# Patient Record
Sex: MALE | Race: WHITE | Employment: OTHER | URBAN - METROPOLITAN AREA
[De-identification: names, ages, dates, MRNs, and addresses within clinical notes are randomized per-mention and may not be internally consistent; named-entity substitution may affect disease eponyms.]

---

## 2017-01-01 ENCOUNTER — APPOINTMENT (OUTPATIENT)
Dept: LAB | Age: 52
End: 2017-01-01
Attending: INTERNAL MEDICINE
Payer: MEDICARE

## 2017-01-01 ENCOUNTER — TELEPHONE (OUTPATIENT)
Dept: INTERNAL MEDICINE CLINIC | Facility: CLINIC | Age: 52
End: 2017-01-01

## 2017-01-01 ENCOUNTER — HOSPITAL ENCOUNTER (OUTPATIENT)
Dept: MRI IMAGING | Age: 52
Discharge: HOME | End: 2017-01-01
Attending: INTERNAL MEDICINE
Payer: MEDICARE

## 2017-01-01 ENCOUNTER — LAB ENCOUNTER (OUTPATIENT)
Dept: LAB | Age: 52
End: 2017-01-01
Attending: INTERNAL MEDICINE
Payer: MEDICARE

## 2017-01-01 ENCOUNTER — OFFICE VISIT (OUTPATIENT)
Dept: INTERNAL MEDICINE CLINIC | Facility: CLINIC | Age: 52
End: 2017-01-01

## 2017-01-01 VITALS
HEIGHT: 64 IN | WEIGHT: 178 LBS | DIASTOLIC BLOOD PRESSURE: 76 MMHG | BODY MASS INDEX: 30.39 KG/M2 | SYSTOLIC BLOOD PRESSURE: 116 MMHG | HEART RATE: 76 BPM | TEMPERATURE: 98 F | OXYGEN SATURATION: 98 % | RESPIRATION RATE: 16 BRPM

## 2017-01-01 DIAGNOSIS — Z00.00 ENCOUNTER FOR ANNUAL HEALTH EXAMINATION: ICD-10-CM

## 2017-01-01 DIAGNOSIS — R27.0 ATAXIA: ICD-10-CM

## 2017-01-01 DIAGNOSIS — E83.52 HYPERCALCEMIA: ICD-10-CM

## 2017-01-01 DIAGNOSIS — R42 DIZZINESS: ICD-10-CM

## 2017-01-01 DIAGNOSIS — Z79.899 LONG-TERM USE OF HIGH-RISK MEDICATION: ICD-10-CM

## 2017-01-01 DIAGNOSIS — E83.52 SERUM CALCIUM ELEVATED: Primary | ICD-10-CM

## 2017-01-01 DIAGNOSIS — N18.2 CKD (CHRONIC KIDNEY DISEASE) STAGE 2, GFR 60-89 ML/MIN: ICD-10-CM

## 2017-01-01 DIAGNOSIS — Z00.00 PHYSICAL EXAM, ANNUAL: Primary | ICD-10-CM

## 2017-01-01 DIAGNOSIS — E83.52 SERUM CALCIUM ELEVATED: ICD-10-CM

## 2017-01-01 DIAGNOSIS — R79.89 ELEVATED PTHRP LEVEL: ICD-10-CM

## 2017-01-01 DIAGNOSIS — Z13.6 SCREENING FOR CARDIOVASCULAR CONDITION: ICD-10-CM

## 2017-01-01 DIAGNOSIS — F30.9 BIPOLAR I DISORDER, SINGLE MANIC EPISODE (HCC): ICD-10-CM

## 2017-01-01 DIAGNOSIS — Z12.5 PROSTATE CANCER SCREENING: ICD-10-CM

## 2017-01-01 DIAGNOSIS — E34.9 INCREASED PTH LEVEL: Primary | ICD-10-CM

## 2017-01-01 DIAGNOSIS — Z13.31 DEPRESSION SCREENING: ICD-10-CM

## 2017-01-01 DIAGNOSIS — Z23 NEED FOR INFLUENZA VACCINATION: ICD-10-CM

## 2017-01-01 PROCEDURE — G0446 INTENS BEHAVE THER CARDIO DX: HCPCS | Performed by: INTERNAL MEDICINE

## 2017-01-01 PROCEDURE — 80053 COMPREHEN METABOLIC PANEL: CPT | Performed by: INTERNAL MEDICINE

## 2017-01-01 PROCEDURE — 80061 LIPID PANEL: CPT | Performed by: INTERNAL MEDICINE

## 2017-01-01 PROCEDURE — 70551 MRI BRAIN STEM W/O DYE: CPT | Performed by: INTERNAL MEDICINE

## 2017-01-01 PROCEDURE — 96160 PT-FOCUSED HLTH RISK ASSMT: CPT | Performed by: INTERNAL MEDICINE

## 2017-01-01 PROCEDURE — G0103 PSA SCREENING: HCPCS | Performed by: INTERNAL MEDICINE

## 2017-01-01 PROCEDURE — G0439 PPPS, SUBSEQ VISIT: HCPCS | Performed by: INTERNAL MEDICINE

## 2017-01-01 PROCEDURE — 82340 ASSAY OF CALCIUM IN URINE: CPT | Performed by: INTERNAL MEDICINE

## 2017-01-01 PROCEDURE — 81003 URINALYSIS AUTO W/O SCOPE: CPT | Performed by: INTERNAL MEDICINE

## 2017-01-01 PROCEDURE — 90686 IIV4 VACC NO PRSV 0.5 ML IM: CPT | Performed by: INTERNAL MEDICINE

## 2017-01-01 PROCEDURE — G0008 ADMIN INFLUENZA VIRUS VAC: HCPCS | Performed by: INTERNAL MEDICINE

## 2017-01-01 PROCEDURE — 36415 COLL VENOUS BLD VENIPUNCTURE: CPT | Performed by: INTERNAL MEDICINE

## 2017-01-01 PROCEDURE — 80178 ASSAY OF LITHIUM: CPT | Performed by: INTERNAL MEDICINE

## 2017-01-01 PROCEDURE — 85025 COMPLETE CBC W/AUTO DIFF WBC: CPT | Performed by: INTERNAL MEDICINE

## 2017-01-01 PROCEDURE — 83970 ASSAY OF PARATHORMONE: CPT | Performed by: INTERNAL MEDICINE

## 2017-01-01 PROCEDURE — 82330 ASSAY OF CALCIUM: CPT | Performed by: INTERNAL MEDICINE

## 2017-01-01 PROCEDURE — 84443 ASSAY THYROID STIM HORMONE: CPT | Performed by: INTERNAL MEDICINE

## 2017-01-01 RX ORDER — OLANZAPINE 20 MG/1
20 TABLET ORAL NIGHTLY
Refills: 0 | Status: ON HOLD | COMMUNITY
End: 2018-01-01

## 2017-03-09 ENCOUNTER — TELEPHONE (OUTPATIENT)
Dept: INTERNAL MEDICINE CLINIC | Facility: CLINIC | Age: 52
End: 2017-03-09

## 2017-03-09 NOTE — TELEPHONE ENCOUNTER
[address] [facility] [phone]  [phone]    Fax  [phone]  Mail  [facility] [id number] [facility]  [address] [id number]

## 2017-06-26 NOTE — TELEPHONE ENCOUNTER
CARIDAD - per [employer], home health performed a home health evaluation for the patient - he was instructed to contact our office for follow up. If questions call [phone].

## 2017-10-30 PROBLEM — R27.0 ATAXIA: Status: ACTIVE | Noted: 2017-01-01

## 2017-10-30 PROBLEM — R42 DIZZINESS: Status: ACTIVE | Noted: 2017-01-01

## 2017-10-30 NOTE — PROGRESS NOTES
HPI:   Kaz Thompson is a 47year old male who presents for a MA (Medicare Advantage) Supervisit (Once per calendar year). HPI:  Here with sister in law  She reports that pt as in [address] [place] [address],[address]. Had syncopal episode. Thought to be due to dehydration.  Labs SOCIAL HISTORY:   He  reports that he has never smoked. He has never used smokeless tobacco. He reports that he does not drink alcohol or use drugs.      REVIEW OF SYSTEMS:   GENERAL: feels well otherwise  SKIN: denies any unusual skin lesions  EYES: john tenderness/mass/nodules, no carotid bruit or JVD   Back:   Symmetric, no curvature, ROM normal, no CVA tenderness   Lungs:   Clear to auscultation bilaterally, respirations unlabored   Chest Wall:  No tenderness or deformity   Heart:  Regular rate and rhyt Future    Ataxia  -     TSH W REFLEX TO FREE T4; Future  -     URINALYSIS, ROUTINE; Future  -     MRI BRAIN/IAC (ALL W+WO CNTRST) (CPT=70553);  Future    Prostate cancer screening  -     PSA SCREEN; Future    Dizziness  -     MRI BRAIN/IAC (ALL W+WO CNTRST) maintain positive mental well-being?: Visiting Friends;Games    If you are a male age 38-65 or a female age 47-67, do you take aspirin?: No    Have you had any immunizations at another office such as Influenza, Hepatitis B, Tetanus, or Pneumococcal?: No the week is this?: Correct    What month is it?: Correct    What year is it?: Correct    Recall \"Ball\": Correct    Recall \"Flag\": Correct    Recall \"Tree\": Correct       This section provided for quick review of chart, separate sheet to patient  PREV 13 (Prevnar) No orders found for this or any previous visit. Pneumococcal 23 (Pneumovax) No orders found for this or any previous visit. Hepatitis B No orders found for this or any previous visit.      Tetanus   Orders placed or performed in visit

## 2017-10-30 NOTE — PATIENT INSTRUCTIONS
Diet for Chronic Kidney Disease  Following a special diet when you have kidney disease can help you stay as healthy as possible. Your healthcare provider or dietitian should make a special diet plan just for you.     Eating right  Here are some good eatin · Lauralyn Lars is important to build strong bones. But foods high in calcium are also high in phosphorus, which can take calcium from your bones. Limiting foods high in phosphorus will help keep calcium in your bones.  Ask your provider how much calcium ---------- Medicare covers annually or at 6-month intervals for prediabetic patients        Cardiovascular Disease Screening     Cholesterol, covered every 5 yrs including Total, LDL and Trigs LDL-CHOLESTEROL (mg/dL (calc))   Date Value   04/01/2011 95 PSA  Annually to 75 then as needed or ANY annually to 75 PSA due on 11/01/2018  No results found for this or any previous visit.    Annually (age 48 or over), ANY not paid separately when covered E/M service is provided on same date    Immunizations      I http://www. idph.state. [place]/public/books/advin.htm  A link to the [employer] Medium. This site has a lot of good information including definitions of the different types of Advance Directives.  It also has the State forms available on it's web

## 2017-11-21 NOTE — TELEPHONE ENCOUNTER
MRI dept are having issues getting a vein on pt, kisha was told that pt does need contrast she will try again and if not will send him over to the hospital to have it done

## 2017-11-21 NOTE — TELEPHONE ENCOUNTER
Patient walked up to the , and is asking does he really need to do MRI? Please advise. He is seated out front.

## 2017-11-21 NOTE — TELEPHONE ENCOUNTER
MRI ordered for dizziness per Dr. Dsouza Patient contrast needs to be given and images obtained. Siri kSy was made aware of this.

## 2017-11-21 NOTE — TELEPHONE ENCOUNTER
Per Dr. Scott Alvarado pt needs to complete MRI with contrast.    D/w pt above he will call and r/s at hospital as off site could not access a vein to give IV contrast. Pt expressed understanding.

## 2017-11-27 NOTE — TELEPHONE ENCOUNTER
Spoke with patient and relayed test results. He verbalized understanding and agreed with plan. He requested that a copy of his labs be faxed to his psychiatrist Dr. Carina Quinn @ fax # [phone]      Order for 24hr urine placed and labs faxed to psych.

## 2017-11-27 NOTE — TELEPHONE ENCOUNTER
Notes Recorded by Gallo Gaming MD on 11/22/2017 at 8:58 AM CST  PTH is elevated. Suspect due to CKD. Await ionized Ca.  Does pt see renal. If not have him see Dr. Spenser Bill    Notes Recorded by Gallo Gaming MD on 11/21/2017 at 2:17 PM CST  Normal lithium lev

## 2017-11-27 NOTE — TELEPHONE ENCOUNTER
----- Message from Marybelle Mortimer, MD sent at 11/24/2017 10:23 AM CST -----  pth and calcium is elevated,  Check 24 hour urine collection for calcium for workup

## 2018-01-01 ENCOUNTER — ANESTHESIA EVENT (OUTPATIENT)
Dept: SURGERY | Facility: HOSPITAL | Age: 53
End: 2018-01-01

## 2018-01-01 ENCOUNTER — HOSPITAL ENCOUNTER (OUTPATIENT)
Dept: NUCLEAR MEDICINE | Facility: HOSPITAL | Age: 53
Discharge: HOME | End: 2018-01-01
Attending: OTOLARYNGOLOGY
Payer: MEDICARE

## 2018-01-01 ENCOUNTER — APPOINTMENT (OUTPATIENT)
Dept: GENERAL RADIOLOGY | Facility: HOSPITAL | Age: 53
DRG: 004 | End: 2018-01-01
Attending: INTERNAL MEDICINE
Payer: MEDICARE

## 2018-01-01 ENCOUNTER — APPOINTMENT (OUTPATIENT)
Dept: MRI IMAGING | Facility: HOSPITAL | Age: 53
DRG: 004 | End: 2018-01-01
Attending: PHYSICIAN ASSISTANT
Payer: MEDICARE

## 2018-01-01 ENCOUNTER — APPOINTMENT (OUTPATIENT)
Dept: GENERAL RADIOLOGY | Facility: HOSPITAL | Age: 53
DRG: 004 | End: 2018-01-01
Attending: NURSE PRACTITIONER
Payer: MEDICARE

## 2018-01-01 ENCOUNTER — SURGERY (OUTPATIENT)
Age: 53
End: 2018-01-01

## 2018-01-01 ENCOUNTER — APPOINTMENT (OUTPATIENT)
Dept: ULTRASOUND IMAGING | Facility: HOSPITAL | Age: 53
DRG: 004 | End: 2018-01-01
Attending: INTERNAL MEDICINE
Payer: MEDICARE

## 2018-01-01 ENCOUNTER — HOSPITAL ENCOUNTER (INPATIENT)
Facility: HOSPITAL | Age: 53
LOS: 4 days | DRG: 091 | End: 2018-01-01
Attending: HOSPITALIST | Admitting: HOSPITALIST
Payer: OTHER MISCELLANEOUS

## 2018-01-01 ENCOUNTER — APPOINTMENT (OUTPATIENT)
Dept: MRI IMAGING | Facility: HOSPITAL | Age: 53
DRG: 004 | End: 2018-01-01
Attending: Other
Payer: MEDICARE

## 2018-01-01 ENCOUNTER — APPOINTMENT (OUTPATIENT)
Dept: GENERAL RADIOLOGY | Facility: HOSPITAL | Age: 53
DRG: 004 | End: 2018-01-01
Attending: PHYSICIAN ASSISTANT
Payer: MEDICARE

## 2018-01-01 ENCOUNTER — HOSPITAL ENCOUNTER (OUTPATIENT)
Dept: ULTRASOUND IMAGING | Age: 53
Discharge: HOME | End: 2018-01-01
Attending: OTOLARYNGOLOGY
Payer: MEDICARE

## 2018-01-01 ENCOUNTER — HOSPITAL ENCOUNTER (INPATIENT)
Facility: HOSPITAL | Age: 53
LOS: 14 days | Discharge: HOSPICE | DRG: 004 | End: 2018-01-01
Attending: EMERGENCY MEDICINE | Admitting: HOSPITALIST
Payer: MEDICARE

## 2018-01-01 ENCOUNTER — APPOINTMENT (OUTPATIENT)
Dept: GENERAL RADIOLOGY | Facility: HOSPITAL | Age: 53
DRG: 004 | End: 2018-01-01
Attending: EMERGENCY MEDICINE
Payer: MEDICARE

## 2018-01-01 ENCOUNTER — TELEPHONE (OUTPATIENT)
Dept: INTERNAL MEDICINE CLINIC | Facility: CLINIC | Age: 53
End: 2018-01-01

## 2018-01-01 ENCOUNTER — APPOINTMENT (OUTPATIENT)
Dept: CT IMAGING | Facility: HOSPITAL | Age: 53
DRG: 004 | End: 2018-01-01
Attending: EMERGENCY MEDICINE
Payer: MEDICARE

## 2018-01-01 ENCOUNTER — HOSPITAL ENCOUNTER (OUTPATIENT)
Facility: HOSPITAL | Age: 53
Discharge: HOME | End: 2018-01-01
Attending: OTOLARYNGOLOGY | Admitting: OTOLARYNGOLOGY
Payer: MEDICARE

## 2018-01-01 ENCOUNTER — APPOINTMENT (OUTPATIENT)
Dept: CT IMAGING | Facility: HOSPITAL | Age: 53
DRG: 004 | End: 2018-01-01
Attending: INTERNAL MEDICINE
Payer: MEDICARE

## 2018-01-01 ENCOUNTER — ANESTHESIA (OUTPATIENT)
Dept: SURGERY | Facility: HOSPITAL | Age: 53
End: 2018-01-01

## 2018-01-01 ENCOUNTER — APPOINTMENT (OUTPATIENT)
Dept: GENERAL RADIOLOGY | Facility: HOSPITAL | Age: 53
DRG: 004 | End: 2018-01-01
Attending: OTOLARYNGOLOGY
Payer: MEDICARE

## 2018-01-01 VITALS
WEIGHT: 149.06 LBS | HEART RATE: 110 BPM | DIASTOLIC BLOOD PRESSURE: 62 MMHG | SYSTOLIC BLOOD PRESSURE: 144 MMHG | RESPIRATION RATE: 42 BRPM | TEMPERATURE: 99 F | OXYGEN SATURATION: 97 % | HEIGHT: 64 IN | BODY MASS INDEX: 25.45 KG/M2

## 2018-01-01 VITALS
TEMPERATURE: 99 F | OXYGEN SATURATION: 83 % | HEART RATE: 64 BPM | DIASTOLIC BLOOD PRESSURE: 53 MMHG | SYSTOLIC BLOOD PRESSURE: 100 MMHG | RESPIRATION RATE: 12 BRPM

## 2018-01-01 VITALS
HEIGHT: 64 IN | SYSTOLIC BLOOD PRESSURE: 125 MMHG | OXYGEN SATURATION: 97 % | DIASTOLIC BLOOD PRESSURE: 63 MMHG | WEIGHT: 156 LBS | HEART RATE: 66 BPM | BODY MASS INDEX: 26.63 KG/M2 | TEMPERATURE: 98 F | RESPIRATION RATE: 16 BRPM

## 2018-01-01 DIAGNOSIS — E21.3 HYPERPARATHYROIDISM (HCC): ICD-10-CM

## 2018-01-01 DIAGNOSIS — D64.9 ANEMIA, UNSPECIFIED TYPE: ICD-10-CM

## 2018-01-01 DIAGNOSIS — H10.33 ACUTE CONJUNCTIVITIS OF BOTH EYES, UNSPECIFIED ACUTE CONJUNCTIVITIS TYPE: ICD-10-CM

## 2018-01-01 DIAGNOSIS — R53.83 LETHARGY: Primary | ICD-10-CM

## 2018-01-01 DIAGNOSIS — R79.89 ELEVATED LITHIUM LEVEL: ICD-10-CM

## 2018-01-01 DIAGNOSIS — F31.9 BIPOLAR AFFECTIVE DISORDER, REMISSION STATUS UNSPECIFIED (HCC): ICD-10-CM

## 2018-01-01 PROCEDURE — 99232 SBSQ HOSP IP/OBS MODERATE 35: CPT | Performed by: INTERNAL MEDICINE

## 2018-01-01 PROCEDURE — 71045 X-RAY EXAM CHEST 1 VIEW: CPT | Performed by: INTERNAL MEDICINE

## 2018-01-01 PROCEDURE — 99221 1ST HOSP IP/OBS SF/LOW 40: CPT | Performed by: CLINICAL NURSE SPECIALIST

## 2018-01-01 PROCEDURE — 5A1955Z RESPIRATORY VENTILATION, GREATER THAN 96 CONSECUTIVE HOURS: ICD-10-PCS | Performed by: ANESTHESIOLOGY

## 2018-01-01 PROCEDURE — 71045 X-RAY EXAM CHEST 1 VIEW: CPT | Performed by: NURSE PRACTITIONER

## 2018-01-01 PROCEDURE — 71045 X-RAY EXAM CHEST 1 VIEW: CPT | Performed by: OTOLARYNGOLOGY

## 2018-01-01 PROCEDURE — 78072 PARATHYRD PLANAR W/SPECT&CT: CPT | Performed by: OTOLARYNGOLOGY

## 2018-01-01 PROCEDURE — 99232 SBSQ HOSP IP/OBS MODERATE 35: CPT | Performed by: OTHER

## 2018-01-01 PROCEDURE — 99233 SBSQ HOSP IP/OBS HIGH 50: CPT | Performed by: INTERNAL MEDICINE

## 2018-01-01 PROCEDURE — 76770 US EXAM ABDO BACK WALL COMP: CPT | Performed by: INTERNAL MEDICINE

## 2018-01-01 PROCEDURE — 99233 SBSQ HOSP IP/OBS HIGH 50: CPT | Performed by: OTHER

## 2018-01-01 PROCEDURE — 70450 CT HEAD/BRAIN W/O DYE: CPT | Performed by: INTERNAL MEDICINE

## 2018-01-01 PROCEDURE — 02HV33Z INSERTION OF INFUSION DEVICE INTO SUPERIOR VENA CAVA, PERCUTANEOUS APPROACH: ICD-10-PCS | Performed by: HOSPITALIST

## 2018-01-01 PROCEDURE — 99231 SBSQ HOSP IP/OBS SF/LOW 25: CPT | Performed by: HOSPITALIST

## 2018-01-01 PROCEDURE — 93970 EXTREMITY STUDY: CPT | Performed by: INTERNAL MEDICINE

## 2018-01-01 PROCEDURE — 99291 CRITICAL CARE FIRST HOUR: CPT | Performed by: OTHER

## 2018-01-01 PROCEDURE — 74018 RADEX ABDOMEN 1 VIEW: CPT | Performed by: NURSE PRACTITIONER

## 2018-01-01 PROCEDURE — 99233 SBSQ HOSP IP/OBS HIGH 50: CPT | Performed by: HOSPITALIST

## 2018-01-01 PROCEDURE — 95819 EEG AWAKE AND ASLEEP: CPT | Performed by: OTHER

## 2018-01-01 PROCEDURE — 95816 EEG AWAKE AND DROWSY: CPT | Performed by: OTHER

## 2018-01-01 PROCEDURE — 71045 X-RAY EXAM CHEST 1 VIEW: CPT | Performed by: EMERGENCY MEDICINE

## 2018-01-01 PROCEDURE — 76700 US EXAM ABDOM COMPLETE: CPT | Performed by: INTERNAL MEDICINE

## 2018-01-01 PROCEDURE — 90792 PSYCH DIAG EVAL W/MED SRVCS: CPT | Performed by: OTHER

## 2018-01-01 PROCEDURE — 0B110F4 BYPASS TRACHEA TO CUTANEOUS WITH TRACHEOSTOMY DEVICE, OPEN APPROACH: ICD-10-PCS | Performed by: OTOLARYNGOLOGY

## 2018-01-01 PROCEDURE — 99291 CRITICAL CARE FIRST HOUR: CPT | Performed by: INTERNAL MEDICINE

## 2018-01-01 PROCEDURE — 99232 SBSQ HOSP IP/OBS MODERATE 35: CPT | Performed by: HOSPITALIST

## 2018-01-01 PROCEDURE — 76536 US EXAM OF HEAD AND NECK: CPT | Performed by: OTOLARYNGOLOGY

## 2018-01-01 PROCEDURE — 99219 INITIAL OBSERVATION CARE,LEVL II: CPT | Performed by: INTERNAL MEDICINE

## 2018-01-01 PROCEDURE — 99223 1ST HOSP IP/OBS HIGH 75: CPT | Performed by: OTHER

## 2018-01-01 PROCEDURE — 0GTQ0ZZ RESECTION OF MULTIPLE PARATHYROID GLANDS, OPEN APPROACH: ICD-10-PCS | Performed by: OTOLARYNGOLOGY

## 2018-01-01 PROCEDURE — 0BH18EZ INSERTION OF ENDOTRACHEAL AIRWAY INTO TRACHEA, VIA NATURAL OR ARTIFICIAL OPENING ENDOSCOPIC: ICD-10-PCS | Performed by: ANESTHESIOLOGY

## 2018-01-01 PROCEDURE — 99222 1ST HOSP IP/OBS MODERATE 55: CPT | Performed by: INTERNAL MEDICINE

## 2018-01-01 PROCEDURE — B548ZZA ULTRASONOGRAPHY OF SUPERIOR VENA CAVA, GUIDANCE: ICD-10-PCS | Performed by: HOSPITALIST

## 2018-01-01 PROCEDURE — 70450 CT HEAD/BRAIN W/O DYE: CPT | Performed by: EMERGENCY MEDICINE

## 2018-01-01 PROCEDURE — 70551 MRI BRAIN STEM W/O DYE: CPT | Performed by: OTHER

## 2018-01-01 PROCEDURE — 99223 1ST HOSP IP/OBS HIGH 75: CPT | Performed by: HOSPITALIST

## 2018-01-01 PROCEDURE — 95822 EEG COMA OR SLEEP ONLY: CPT | Performed by: OTHER

## 2018-01-01 PROCEDURE — 70551 MRI BRAIN STEM W/O DYE: CPT | Performed by: PHYSICIAN ASSISTANT

## 2018-01-01 RX ORDER — PEPPERMINT OIL
10 OIL (ML) MISCELLANEOUS AS NEEDED
Status: CANCELLED | OUTPATIENT
Start: 2018-01-01

## 2018-01-01 RX ORDER — INDOMETHACIN 25 MG/1
50 CAPSULE ORAL 2 TIMES DAILY
Status: DISCONTINUED | OUTPATIENT
Start: 2018-01-01 | End: 2018-01-01

## 2018-01-01 RX ORDER — DEXTROSE MONOHYDRATE 25 G/50ML
50 INJECTION, SOLUTION INTRAVENOUS
Status: DISCONTINUED | OUTPATIENT
Start: 2018-01-01 | End: 2018-01-01

## 2018-01-01 RX ORDER — HYDROCODONE BITARTRATE AND ACETAMINOPHEN 5; 325 MG/1; MG/1
2 TABLET ORAL EVERY 4 HOURS PRN
Qty: 30 TABLET | Refills: 0 | Status: SHIPPED | OUTPATIENT
Start: 2018-01-01 | End: 2018-01-01

## 2018-01-01 RX ORDER — MIDAZOLAM HYDROCHLORIDE 1 MG/ML
2 INJECTION INTRAMUSCULAR; INTRAVENOUS EVERY 5 MIN PRN
Status: DISCONTINUED | OUTPATIENT
Start: 2018-01-01 | End: 2018-01-01

## 2018-01-01 RX ORDER — TOBRAMYCIN 3 MG/ML
1 SOLUTION/ DROPS OPHTHALMIC
Status: DISCONTINUED | OUTPATIENT
Start: 2018-01-01 | End: 2018-01-01

## 2018-01-01 RX ORDER — BUPROPION HYDROCHLORIDE 75 MG/1
75 TABLET ORAL DAILY
Status: DISCONTINUED | OUTPATIENT
Start: 2018-01-01 | End: 2018-01-01

## 2018-01-01 RX ORDER — GLYCOPYRROLATE 0.2 MG/ML
0.4 INJECTION, SOLUTION INTRAMUSCULAR; INTRAVENOUS
Status: DISCONTINUED | OUTPATIENT
Start: 2018-01-01 | End: 2018-01-01

## 2018-01-01 RX ORDER — ACETAMINOPHEN 650 MG/1
650 SUPPOSITORY RECTAL EVERY 6 HOURS PRN
Status: CANCELLED | OUTPATIENT
Start: 2018-01-01

## 2018-01-01 RX ORDER — MIDAZOLAM HYDROCHLORIDE 1 MG/ML
1 INJECTION INTRAMUSCULAR; INTRAVENOUS EVERY 5 MIN PRN
Status: DISCONTINUED | OUTPATIENT
Start: 2018-01-01 | End: 2018-01-01 | Stop reason: HOSPADM

## 2018-01-01 RX ORDER — SODIUM PHOSPHATE, DIBASIC AND SODIUM PHOSPHATE, MONOBASIC 7; 19 G/133ML; G/133ML
1 ENEMA RECTAL ONCE AS NEEDED
Status: DISCONTINUED | OUTPATIENT
Start: 2018-01-01 | End: 2018-01-01

## 2018-01-01 RX ORDER — SCOLOPAMINE TRANSDERMAL SYSTEM 1 MG/1
1 PATCH, EXTENDED RELEASE TRANSDERMAL
Status: DISCONTINUED | OUTPATIENT
Start: 2018-01-01 | End: 2018-01-01

## 2018-01-01 RX ORDER — BISACODYL 10 MG
10 SUPPOSITORY, RECTAL RECTAL
Status: CANCELLED | OUTPATIENT
Start: 2018-01-01

## 2018-01-01 RX ORDER — LORAZEPAM 2 MG/ML
2 INJECTION INTRAMUSCULAR EVERY 4 HOURS PRN
Status: DISCONTINUED | OUTPATIENT
Start: 2018-01-01 | End: 2018-01-01

## 2018-01-01 RX ORDER — LORAZEPAM 2 MG/ML
0.5 INJECTION INTRAMUSCULAR EVERY 4 HOURS PRN
Status: DISCONTINUED | OUTPATIENT
Start: 2018-01-01 | End: 2018-01-01

## 2018-01-01 RX ORDER — SODIUM CHLORIDE, SODIUM LACTATE, POTASSIUM CHLORIDE, CALCIUM CHLORIDE 600; 310; 30; 20 MG/100ML; MG/100ML; MG/100ML; MG/100ML
INJECTION, SOLUTION INTRAVENOUS CONTINUOUS
Status: DISCONTINUED | OUTPATIENT
Start: 2018-01-01 | End: 2018-01-01 | Stop reason: HOSPADM

## 2018-01-01 RX ORDER — CALCIUM CARBONATE 200(500)MG
1500 TABLET,CHEWABLE ORAL
Status: DISCONTINUED | OUTPATIENT
Start: 2018-01-01 | End: 2018-01-01

## 2018-01-01 RX ORDER — LABETALOL HYDROCHLORIDE 5 MG/ML
5 INJECTION, SOLUTION INTRAVENOUS EVERY 5 MIN PRN
Status: DISCONTINUED | OUTPATIENT
Start: 2018-01-01 | End: 2018-01-01 | Stop reason: HOSPADM

## 2018-01-01 RX ORDER — BISACODYL 10 MG
10 SUPPOSITORY, RECTAL RECTAL
Status: DISCONTINUED | OUTPATIENT
Start: 2018-01-01 | End: 2018-01-01

## 2018-01-01 RX ORDER — ARIPIPRAZOLE 15 MG/1
40 TABLET ORAL ONCE
Status: COMPLETED | OUTPATIENT
Start: 2018-01-01 | End: 2018-01-01

## 2018-01-01 RX ORDER — LIDOCAINE HYDROCHLORIDE AND EPINEPHRINE 10; 10 MG/ML; UG/ML
INJECTION, SOLUTION INFILTRATION; PERINEURAL AS NEEDED
Status: DISCONTINUED | OUTPATIENT
Start: 2018-01-01 | End: 2018-01-01 | Stop reason: HOSPADM

## 2018-01-01 RX ORDER — LITHIUM CARBONATE 600 MG/1
600 CAPSULE ORAL NIGHTLY
Status: DISCONTINUED | OUTPATIENT
Start: 2018-01-01 | End: 2018-01-01

## 2018-01-01 RX ORDER — CALCITRIOL 0.25 UG/1
0.25 CAPSULE, LIQUID FILLED ORAL DAILY
Status: DISCONTINUED | OUTPATIENT
Start: 2018-01-01 | End: 2018-01-01

## 2018-01-01 RX ORDER — LITHIUM CARBONATE 300 MG/1
600 CAPSULE ORAL NIGHTLY
Status: DISCONTINUED | OUTPATIENT
Start: 2018-01-01 | End: 2018-01-01

## 2018-01-01 RX ORDER — DEXTROSE MONOHYDRATE 50 MG/ML
INJECTION, SOLUTION INTRAVENOUS CONTINUOUS
Status: DISCONTINUED | OUTPATIENT
Start: 2018-01-01 | End: 2018-01-01

## 2018-01-01 RX ORDER — SODIUM CHLORIDE 0.9 % (FLUSH) 0.9 %
10 SYRINGE (ML) INJECTION AS NEEDED
Status: DISCONTINUED | OUTPATIENT
Start: 2018-01-01 | End: 2018-01-01

## 2018-01-01 RX ORDER — SODIUM PHOSPHATE, DIBASIC AND SODIUM PHOSPHATE, MONOBASIC 7; 19 G/133ML; G/133ML
1 ENEMA RECTAL ONCE AS NEEDED
Status: CANCELLED | OUTPATIENT
Start: 2018-01-01

## 2018-01-01 RX ORDER — ACETAMINOPHEN 650 MG/1
650 SUPPOSITORY RECTAL ONCE
Status: COMPLETED | OUTPATIENT
Start: 2018-01-01 | End: 2018-01-01

## 2018-01-01 RX ORDER — PEPPERMINT OIL
10 OIL (ML) MISCELLANEOUS AS NEEDED
Status: DISCONTINUED | OUTPATIENT
Start: 2018-01-01 | End: 2018-01-01

## 2018-01-01 RX ORDER — GLYCOPYRROLATE 0.2 MG/ML
0.4 INJECTION, SOLUTION INTRAMUSCULAR; INTRAVENOUS
Status: CANCELLED | OUTPATIENT
Start: 2018-01-01

## 2018-01-01 RX ORDER — MORPHINE SULFATE 4 MG/ML
8 INJECTION, SOLUTION INTRAMUSCULAR; INTRAVENOUS EVERY 2 HOUR PRN
Status: DISCONTINUED | OUTPATIENT
Start: 2018-01-01 | End: 2018-01-01

## 2018-01-01 RX ORDER — MEPERIDINE HYDROCHLORIDE 25 MG/ML
12.5 INJECTION INTRAMUSCULAR; INTRAVENOUS; SUBCUTANEOUS AS NEEDED
Status: DISCONTINUED | OUTPATIENT
Start: 2018-01-01 | End: 2018-01-01 | Stop reason: HOSPADM

## 2018-01-01 RX ORDER — ONDANSETRON 2 MG/ML
4 INJECTION INTRAMUSCULAR; INTRAVENOUS EVERY 6 HOURS PRN
Status: CANCELLED | OUTPATIENT
Start: 2018-01-01

## 2018-01-01 RX ORDER — OLANZAPINE 10 MG/1
10 TABLET ORAL NIGHTLY
Status: DISCONTINUED | OUTPATIENT
Start: 2018-01-01 | End: 2018-01-01

## 2018-01-01 RX ORDER — HYDROCODONE BITARTRATE AND ACETAMINOPHEN 5; 325 MG/1; MG/1
2 TABLET ORAL EVERY 4 HOURS PRN
Qty: 30 TABLET | Refills: 0 | Status: ON HOLD | OUTPATIENT
Start: 2018-01-01 | End: 2018-01-01

## 2018-01-01 RX ORDER — CALCIUM CARBONATE 200(500)MG
1500 TABLET,CHEWABLE ORAL
Qty: 180 TABLET | Refills: 6 | Status: ON HOLD | OUTPATIENT
Start: 2018-01-01 | End: 2018-01-01

## 2018-01-01 RX ORDER — LITHIUM CARBONATE 300 MG/1
600 CAPSULE ORAL NIGHTLY
Status: ON HOLD | COMMUNITY
End: 2018-01-01

## 2018-01-01 RX ORDER — ONDANSETRON 2 MG/ML
4 INJECTION INTRAMUSCULAR; INTRAVENOUS EVERY 6 HOURS PRN
Status: DISCONTINUED | OUTPATIENT
Start: 2018-01-01 | End: 2018-01-01

## 2018-01-01 RX ORDER — ONDANSETRON 2 MG/ML
4 INJECTION INTRAMUSCULAR; INTRAVENOUS AS NEEDED
Status: DISCONTINUED | OUTPATIENT
Start: 2018-01-01 | End: 2018-01-01 | Stop reason: HOSPADM

## 2018-01-01 RX ORDER — SODIUM CHLORIDE 9 MG/ML
INJECTION, SOLUTION INTRAVENOUS CONTINUOUS
Status: DISCONTINUED | OUTPATIENT
Start: 2018-01-01 | End: 2018-01-01

## 2018-01-01 RX ORDER — POLYETHYLENE GLYCOL 3350 17 G/17G
17 POWDER, FOR SOLUTION ORAL DAILY PRN
Status: DISCONTINUED | OUTPATIENT
Start: 2018-01-01 | End: 2018-01-01

## 2018-01-01 RX ORDER — ACETAMINOPHEN 160 MG/5ML
650 SOLUTION ORAL EVERY 6 HOURS PRN
Status: DISCONTINUED | OUTPATIENT
Start: 2018-01-01 | End: 2018-01-01

## 2018-01-01 RX ORDER — NALOXONE HYDROCHLORIDE 0.4 MG/ML
80 INJECTION, SOLUTION INTRAMUSCULAR; INTRAVENOUS; SUBCUTANEOUS AS NEEDED
Status: DISCONTINUED | OUTPATIENT
Start: 2018-01-01 | End: 2018-01-01 | Stop reason: HOSPADM

## 2018-01-01 RX ORDER — LITHIUM CARBONATE 600 MG/1
600 CAPSULE ORAL EVERY MORNING
Status: DISCONTINUED | OUTPATIENT
Start: 2018-01-01 | End: 2018-01-01

## 2018-01-01 RX ORDER — MORPHINE SULFATE 4 MG/ML
2 INJECTION, SOLUTION INTRAMUSCULAR; INTRAVENOUS EVERY 2 HOUR PRN
Status: DISCONTINUED | OUTPATIENT
Start: 2018-01-01 | End: 2018-01-01

## 2018-01-01 RX ORDER — MORPHINE SULFATE 4 MG/ML
4 INJECTION, SOLUTION INTRAMUSCULAR; INTRAVENOUS EVERY 2 HOUR PRN
Status: DISCONTINUED | OUTPATIENT
Start: 2018-01-01 | End: 2018-01-01

## 2018-01-01 RX ORDER — ZOLPIDEM TARTRATE 5 MG/1
5 TABLET ORAL NIGHTLY PRN
Status: DISCONTINUED | OUTPATIENT
Start: 2018-01-01 | End: 2018-01-01

## 2018-01-01 RX ORDER — ACETAMINOPHEN 650 MG/1
650 SUPPOSITORY RECTAL EVERY 6 HOURS PRN
Status: DISCONTINUED | OUTPATIENT
Start: 2018-01-01 | End: 2018-01-01

## 2018-01-01 RX ORDER — POTASSIUM CHLORIDE 29.8 MG/ML
40 INJECTION INTRAVENOUS ONCE
Status: COMPLETED | OUTPATIENT
Start: 2018-01-01 | End: 2018-01-01

## 2018-01-01 RX ORDER — ACETAMINOPHEN 325 MG/1
650 TABLET ORAL EVERY 4 HOURS PRN
Status: DISCONTINUED | OUTPATIENT
Start: 2018-01-01 | End: 2018-01-01

## 2018-01-01 RX ORDER — MIDAZOLAM HYDROCHLORIDE 1 MG/ML
INJECTION INTRAMUSCULAR; INTRAVENOUS
Status: COMPLETED
Start: 2018-01-01 | End: 2018-01-01

## 2018-01-01 RX ORDER — CALCITRIOL 1 UG/ML
1 SOLUTION ORAL DAILY
Status: DISCONTINUED | OUTPATIENT
Start: 2018-01-01 | End: 2018-01-01

## 2018-01-01 RX ORDER — OLANZAPINE 20 MG/1
20 TABLET ORAL NIGHTLY
Status: DISCONTINUED | OUTPATIENT
Start: 2018-01-01 | End: 2018-01-01

## 2018-01-01 RX ORDER — HEPARIN SODIUM 5000 [USP'U]/ML
5000 INJECTION, SOLUTION INTRAVENOUS; SUBCUTANEOUS EVERY 8 HOURS SCHEDULED
Status: DISCONTINUED | OUTPATIENT
Start: 2018-01-01 | End: 2018-01-01

## 2018-01-01 RX ORDER — CALCITRIOL 0.25 UG/1
CAPSULE, LIQUID FILLED ORAL
Status: COMPLETED
Start: 2018-01-01 | End: 2018-01-01

## 2018-01-01 RX ORDER — SCOLOPAMINE TRANSDERMAL SYSTEM 1 MG/1
1 PATCH, EXTENDED RELEASE TRANSDERMAL
Status: CANCELLED | OUTPATIENT
Start: 2018-01-01

## 2018-01-01 RX ORDER — DEXTROSE, SODIUM CHLORIDE, SODIUM LACTATE, POTASSIUM CHLORIDE, AND CALCIUM CHLORIDE 5; .6; .31; .03; .02 G/100ML; G/100ML; G/100ML; G/100ML; G/100ML
INJECTION, SOLUTION INTRAVENOUS CONTINUOUS
Status: DISCONTINUED | OUTPATIENT
Start: 2018-01-01 | End: 2018-01-01

## 2018-01-01 RX ORDER — LITHIUM CARBONATE 300 MG/1
600 CAPSULE ORAL EVERY MORNING
Status: ON HOLD | COMMUNITY
End: 2018-01-01

## 2018-01-01 RX ORDER — ACETAMINOPHEN 325 MG/1
650 TABLET ORAL EVERY 6 HOURS PRN
Status: DISCONTINUED | OUTPATIENT
Start: 2018-01-01 | End: 2018-01-01

## 2018-01-01 RX ORDER — IBUPROFEN 400 MG/1
400 TABLET ORAL ONCE
Status: COMPLETED | OUTPATIENT
Start: 2018-01-01 | End: 2018-01-01

## 2018-01-01 RX ORDER — DESMOPRESSIN ACETATE 4 UG/ML
2 INJECTION, SOLUTION INTRAVENOUS; SUBCUTANEOUS 2 TIMES DAILY
Status: COMPLETED | OUTPATIENT
Start: 2018-01-01 | End: 2018-01-01

## 2018-01-01 RX ORDER — CALCIUM GLUCONATE-DEXTROSE IV SOLN 2 GRAM/100ML-5% 2-5/100 GM/ML-%
2 SOLUTION INTRAVENOUS 3 TIMES DAILY
Status: DISCONTINUED | OUTPATIENT
Start: 2018-01-01 | End: 2018-01-01 | Stop reason: SDUPTHER

## 2018-01-01 RX ORDER — DEXMEDETOMIDINE HYDROCHLORIDE 4 UG/ML
INJECTION, SOLUTION INTRAVENOUS CONTINUOUS
Status: DISCONTINUED | OUTPATIENT
Start: 2018-01-01 | End: 2018-01-01

## 2018-01-01 RX ORDER — HYDROCODONE BITARTRATE AND ACETAMINOPHEN 5; 325 MG/1; MG/1
1 TABLET ORAL AS NEEDED
Status: DISCONTINUED | OUTPATIENT
Start: 2018-01-01 | End: 2018-01-01 | Stop reason: HOSPADM

## 2018-01-01 RX ORDER — SODIUM CHLORIDE 9 MG/ML
INJECTION, SOLUTION INTRAVENOUS CONTINUOUS
Status: ACTIVE | OUTPATIENT
Start: 2018-01-01 | End: 2018-01-01

## 2018-01-01 RX ORDER — DESMOPRESSIN ACETATE 4 UG/ML
3 INJECTION, SOLUTION INTRAVENOUS; SUBCUTANEOUS 2 TIMES DAILY
Status: COMPLETED | OUTPATIENT
Start: 2018-01-01 | End: 2018-01-01

## 2018-01-01 RX ORDER — CALCITRIOL 0.25 UG/1
0.25 CAPSULE, LIQUID FILLED ORAL DAILY
Qty: 90 CAPSULE | Refills: 3 | Status: ON HOLD | OUTPATIENT
Start: 2018-01-01 | End: 2018-01-01

## 2018-01-01 RX ORDER — DEXTROSE MONOHYDRATE 50 MG/ML
INJECTION, SOLUTION INTRAVENOUS CONTINUOUS
Status: ACTIVE | OUTPATIENT
Start: 2018-01-01 | End: 2018-01-01

## 2018-01-01 RX ORDER — LIDOCAINE HYDROCHLORIDE AND EPINEPHRINE 10; 10 MG/ML; UG/ML
INJECTION, SOLUTION INFILTRATION; PERINEURAL AS NEEDED
Status: DISCONTINUED | OUTPATIENT
Start: 2018-01-01 | End: 2018-01-01

## 2018-01-01 RX ORDER — DESMOPRESSIN ACETATE 4 UG/ML
2 INJECTION, SOLUTION INTRAVENOUS; SUBCUTANEOUS 2 TIMES DAILY
Status: DISCONTINUED | OUTPATIENT
Start: 2018-01-01 | End: 2018-01-01

## 2018-01-01 RX ORDER — MAGNESIUM OXIDE 400 MG (241.3 MG MAGNESIUM) TABLET
400 TABLET ONCE
Status: COMPLETED | OUTPATIENT
Start: 2018-01-01 | End: 2018-01-01

## 2018-01-01 RX ORDER — CHLORHEXIDINE GLUCONATE 0.12 MG/ML
15 RINSE ORAL
Status: DISCONTINUED | OUTPATIENT
Start: 2018-01-01 | End: 2018-01-01

## 2018-01-01 RX ORDER — LITHIUM CARBONATE 300 MG/1
300 CAPSULE ORAL ONCE
Status: COMPLETED | OUTPATIENT
Start: 2018-01-01 | End: 2018-01-01

## 2018-01-01 RX ORDER — LORAZEPAM 2 MG/ML
2 INJECTION INTRAMUSCULAR EVERY 4 HOURS PRN
Status: CANCELLED | OUTPATIENT
Start: 2018-01-01

## 2018-01-01 RX ORDER — CALCITRIOL 1 UG/ML
0.5 SOLUTION ORAL DAILY
Status: DISCONTINUED | OUTPATIENT
Start: 2018-01-01 | End: 2018-01-01

## 2018-01-01 RX ORDER — LORAZEPAM 2 MG/ML
1 INJECTION INTRAMUSCULAR EVERY 4 HOURS PRN
Status: DISCONTINUED | OUTPATIENT
Start: 2018-01-01 | End: 2018-01-01

## 2018-01-01 RX ORDER — HYDROCODONE BITARTRATE AND ACETAMINOPHEN 5; 325 MG/1; MG/1
2 TABLET ORAL EVERY 4 HOURS PRN
Status: DISCONTINUED | OUTPATIENT
Start: 2018-01-01 | End: 2018-01-01

## 2018-01-01 RX ORDER — DESMOPRESSIN ACETATE 0.1 MG/1
200 TABLET ORAL 2 TIMES DAILY
Status: DISCONTINUED | OUTPATIENT
Start: 2018-01-01 | End: 2018-01-01

## 2018-01-01 RX ORDER — LORAZEPAM 2 MG/ML
1 INJECTION INTRAMUSCULAR EVERY 4 HOURS PRN
Status: CANCELLED | OUTPATIENT
Start: 2018-01-01

## 2018-01-01 RX ORDER — ETOMIDATE 2 MG/ML
INJECTION INTRAVENOUS
Status: COMPLETED
Start: 2018-01-01 | End: 2018-01-01

## 2018-01-01 RX ORDER — HYDROCODONE BITARTRATE AND ACETAMINOPHEN 5; 325 MG/1; MG/1
2 TABLET ORAL AS NEEDED
Status: DISCONTINUED | OUTPATIENT
Start: 2018-01-01 | End: 2018-01-01 | Stop reason: HOSPADM

## 2018-01-01 RX ORDER — LITHIUM CARBONATE 300 MG/1
300 CAPSULE ORAL NIGHTLY
Status: DISCONTINUED | OUTPATIENT
Start: 2018-01-01 | End: 2018-01-01

## 2018-01-01 RX ORDER — ETOMIDATE 2 MG/ML
INJECTION INTRAVENOUS
Status: DISCONTINUED
Start: 2018-01-01 | End: 2018-01-01 | Stop reason: WASHOUT

## 2018-01-01 RX ORDER — LORAZEPAM 2 MG/ML
0.5 INJECTION INTRAMUSCULAR EVERY 4 HOURS PRN
Status: CANCELLED | OUTPATIENT
Start: 2018-01-01

## 2018-01-01 RX ORDER — HYDROCHLOROTHIAZIDE 25 MG/1
25 TABLET ORAL DAILY
Status: DISCONTINUED | OUTPATIENT
Start: 2018-01-01 | End: 2018-01-01

## 2018-01-01 RX ORDER — LORAZEPAM 2 MG/ML
0.5 INJECTION INTRAMUSCULAR ONCE
Status: DISCONTINUED | OUTPATIENT
Start: 2018-01-01 | End: 2018-01-01

## 2018-01-01 RX ORDER — HYDROCODONE BITARTRATE AND ACETAMINOPHEN 5; 325 MG/1; MG/1
1 TABLET ORAL EVERY 4 HOURS PRN
Status: DISCONTINUED | OUTPATIENT
Start: 2018-01-01 | End: 2018-01-01

## 2018-01-01 RX ORDER — DEXTROSE MONOHYDRATE 50 MG/ML
1000 INJECTION, SOLUTION INTRAVENOUS ONCE
Status: COMPLETED | OUTPATIENT
Start: 2018-01-01 | End: 2018-01-01

## 2018-01-01 RX ORDER — DIPHENHYDRAMINE HYDROCHLORIDE 50 MG/ML
12.5 INJECTION INTRAMUSCULAR; INTRAVENOUS AS NEEDED
Status: DISCONTINUED | OUTPATIENT
Start: 2018-01-01 | End: 2018-01-01 | Stop reason: HOSPADM

## 2018-01-01 RX ORDER — LITHIUM CARBONATE 300 MG/1
600 CAPSULE ORAL EVERY MORNING
Status: DISCONTINUED | OUTPATIENT
Start: 2018-01-01 | End: 2018-01-01

## 2018-01-01 RX ORDER — LORAZEPAM 2 MG/ML
INJECTION INTRAMUSCULAR
Status: COMPLETED
Start: 2018-01-01 | End: 2018-01-01

## 2018-01-12 NOTE — TELEPHONE ENCOUNTER
Patient's sister, Lemuel, called and requested recent labs to be faxed to Dr. Hermilo Byrd, psychiatrist. PH# [phone]; I called their office and they were closed; LM to CB.

## 2018-04-06 NOTE — H&P
[address] Patient Status:  Outpatient in a Bed    1965 MRN [id number]   [facility] SURGERY Attending Ama Mccray MD   Hosp Day # 0 PCP Scotty Golden MD     History of Present Illnes manic episode (HCC)     CKD (chronic kidney disease) stage 2, GFR 60-89 ml/min     Ataxia     Dizziness    US shows MNG with largest nodule on left at 20mm,  This could represent a large parathyroid gland    Parathyroid SPECT scan shows increased activity

## 2018-04-12 NOTE — CONSULTS
[facility]  Report of Consultation    Pavan Luciano Patient Status:  Outpatient in a Bed    1965 MRN [id number]   [facility] 3NW-A Attending Leydi Trinidad MD   Hosp Day # 0 PCP Brianna Corona MD     Reason for Consultation Intravenous, Q2H PRN  •  acetaminophen (TYLENOL) tab 650 mg, 650 mg, Oral, Q4H PRN **OR** HYDROcodone-acetaminophen (NORCO) 5-325 MG per tab 1 tablet, 1 tablet, Oral, Q4H PRN **OR** HYDROcodone-acetaminophen (NORCO) 5-325 MG per tab 2 tablet, 2 tablet, [place] Jaimes Date Value   04/01/2011 1.21   ----------  Creatinine (mg/dL)   Date Value   11/21/2017 1.60 (H)   11/01/2016 1.57 (H)   12/02/2014 1.3 (H)   ----------    No results found for: MICROALBCREA    No results for input(s): WBC, HGB, MCV, PLT, BAND, INR in th

## 2018-04-12 NOTE — BRIEF OP NOTE
Pre-Operative Diagnosis: Hyperparathyroidism      Post-Operative Diagnosis: Hyperparathyroidism       Procedure Performed:   Procedure(s):  BILATERAL PARATHYROIDECTOMY     Surgeon(s) and Role:     * Shannon Ortiz MD - Primary     * Polly Carr MD -

## 2018-04-12 NOTE — OPERATIVE REPORT
[facility]    PATIENT'S NAME: Lucie Santiago   ATTENDING PHYSICIAN: Jose J Aguirre M.D. OPERATING PHYSICIAN: Jose J Aguirre M.D.    PATIENT ACCOUNT#:   [de-identified]    LOCATION:  [address]  MEDICAL RECORD #:   [id number]       DATE OF AGUILA there was parathyroid tissue. This was carefully teased free of the thyroid capsule and sent to Pathology for analysis. This is consistent with parathyroid tissue. We then continued our dissection superiorly.   We identified the recurrent laryngeal nerve 14:12:31  [facility] [id number]  QF/

## 2018-04-12 NOTE — ANESTHESIA PREPROCEDURE EVALUATION
PRE-OP EVALUATION    Patient Name: Honey Pain    Pre-op Diagnosis: PARATHYROID ADENOMA    Procedure(s):  BILATERAL PARATHYROIDECTOMY     Surgeon(s) and Role:     * Nikia Fernandes MD - Primary     * Garett Christina MD - Assisting Surgeon    Pre-op Cardiovascular exam normal.         Dental  Comment: Overall poor dentition, chipped front upper incisors, multiple caries throughout.  Missing several molars           Pulmonary    Pulmonary exam normal.                 Other findings            ASA: 2   P

## 2018-04-12 NOTE — H&P
Pre-op Diagnosis: PARATHYROID ADENOMA    The above referenced H&P was reviewed by Elen Al MD on 4/12/2018, the patient was examined and no significant changes have occurred in the patient's condition since the H&P was performed.   I discussed wit

## 2018-04-12 NOTE — ANESTHESIA POSTPROCEDURE EVALUATION
[address] [facility] Patient Status:  Outpatient in a Bed   Age/Gender 47year old male MRN [id number]   Location [address] [facility] Attending Lucila Laughlin MD   Hosp Day # 0 PCP Bean Alvarez MD       Anesthesia

## 2018-04-12 NOTE — BRIEF OP NOTE
Pre-Operative Diagnosis: Hyperparathyroidism      Post-Operative Diagnosis: Hyperparathyroidism       Procedure Performed:   Procedure(s):  BILATERAL PARATHYROIDECTOMY     Surgeon(s) and Role:     * Lucila Laughlin MD - Primary     * Amado King MD -

## 2018-04-13 NOTE — PROGRESS NOTES
NURSING DISCHARGE NOTE    Discharged Home via Wheelchair. Accompanied by Support staff  Belongings Taken by patient/family   IV d/c'd with catheter tip noted intact and without complication.   Note anterior neck incision with steri strips clean, dry an

## 2018-04-13 NOTE — PROGRESS NOTES
[facility]  Progress Note    Cyndi Kai Patient Status:  Outpatient in a Bed    1965 MRN [id number]   [facility] 3NW-A Attending Anibal Fink MD   Hosp Day # 0 PCP Manoj Rawls MD     Subjective:  Little pain.   No nu

## 2018-04-13 NOTE — PLAN OF CARE
DISCHARGE PLANNING    • Discharge to home or other facility with appropriate resources Progressing        METABOLIC/FLUID AND ELECTROLYTES - ADULT    • Hemodynamic stability and optimal renal function maintained Progressing        PAIN - ADULT    • Gavi Bills

## 2018-04-13 NOTE — PLAN OF CARE
DISCHARGE PLANNING    • Discharge to home or other facility with appropriate resources Progressing        METABOLIC/FLUID AND ELECTROLYTES - ADULT    • Hemodynamic stability and optimal renal function maintained Progressing        PAIN - ADULT    • Caterina Drake

## 2018-04-13 NOTE — PAYOR COMM NOTE
--------------  ADMISSION REVIEW     Payor: [address] #:  [id number]  Authorization Number: [id number]/A    Admit date: 04/12/2018      Admitting Physician: Virgen Hale MD  Attending Physician:  Virgen Hale MD  Primary Care P Route User    4/13/2018 0827 Given 1500 mg Oral Derek Akers RN    4/12/2018 1750 Given 1500 mg Oral Pura Oliver RN    4/12/2018 1259 Given 1500 mg Oral Pura Oliver RN      dextrose 5 % /lactated ringers infusion     Date Action Dose Ro

## 2018-04-20 PROBLEM — D64.9 ANEMIA, UNSPECIFIED TYPE: Status: ACTIVE | Noted: 2018-01-01

## 2018-04-20 PROBLEM — R53.83 LETHARGY: Status: ACTIVE | Noted: 2018-01-01

## 2018-04-20 PROBLEM — F31.9 BIPOLAR AFFECTIVE DISORDER, REMISSION STATUS UNSPECIFIED (HCC): Status: ACTIVE | Noted: 2018-01-01

## 2018-04-20 PROBLEM — H10.33 ACUTE CONJUNCTIVITIS OF BOTH EYES, UNSPECIFIED ACUTE CONJUNCTIVITIS TYPE: Status: ACTIVE | Noted: 2018-01-01

## 2018-04-20 PROBLEM — R79.89 ELEVATED LITHIUM LEVEL: Status: ACTIVE | Noted: 2018-01-01

## 2018-04-20 NOTE — CONSULTS
[facility]  Report of Psychiatric Consultation    Cory High Patient Status:  Inpatient    1965 MRN [id number]   [facility] 3NE-A Attending Pratik Vidal MD   Hosp Day # 0 PCP Urban Limon MD     Date of Admission: 17 feeling tired, anxious, depressed, and shaky. He is disorganized and slow to speak. No voices, visions, or suicidal ideation. Per mother, he is less drowsy and less tremulous after getting the IVF's.      Past Psychiatric History:  1) Bipolar 1 disorder dx nervous/anxious.       Mental Status Exam:     Objective       04/20/18  1205   BP: 159/95   Pulse: 105   Resp:    Temp: 98.6 °F (37 °C)     Appearance: fair grooming  Behavior: cooperative, mild hand tremors, poor eye contact    Speech: soft and terse    M

## 2018-04-20 NOTE — PROGRESS NOTES
PSYCH CONSULT    Date of Admission: 4/20/18  Date of Consult: 4/20/18  Reason for Consultation: Lithium toxicity, bipolar disorder    Impression:  AXIS 1: Acute delirium from lithium toxicity, LILLIE, and hypocalcemia. Bipolar 1 disorder, chronic, stable.

## 2018-04-20 NOTE — PROGRESS NOTES
NURSING ADMISSION NOTE      Patient admitted via Cart  Oriented to room. Safety precautions initiated. Bed in low position. Call light in reach. Patient drowsy, orientated x2. Restless. VSS,  on tele, room air.

## 2018-04-20 NOTE — ED PROVIDER NOTES
Patient Seen in: [facility] Emergency Department    History   Patient presents with:  Altered Mental Status (neurologic)    Stated Complaint: AMS    HPI    18-year-old male with a history of bipolar disorder status post parathyroidectomy 8 days ago respond but awake sitting in a gurney. He is oriented to person only, thinks he is at home and is unable to indicate the day of the week. He did recognize his sister. He could not recall his recent surgery. HEENT: Normocephalic atraumatic.   Anicteric s Results of the Urine Drug Screen should be used only for medical purposes. MAGNESIUM - Normal   PHOSPHORUS - Normal   FREE T4 (FREE THYROXINE) - Normal   CBC WITH DIFFERENTIAL WITH PLATELET    Narrative:      The following orders were created for panel or Impression:  Lethargy  (primary encounter diagnosis)  Elevated lithium level  Bipolar affective disorder, remission status unspecified (HCC)  Anemia, unspecified type  Acute conjunctivitis of both eyes, unspecified acute conjunctivitis type    Disposition:

## 2018-04-20 NOTE — ED INITIAL ASSESSMENT (HPI)
Pt had part of his parathyroid gland removed last Thursday. 8 days ago and started with confusion and slow to respond verbally. Pt oriented to person but not place this and time this am. Increase in weakness as well. Right arm tremor is new as well.  Goyo

## 2018-04-20 NOTE — CONSULTS
[facility]  Report of Consultation    Cory Echruslan Patient Status:  Inpatient    1965 MRN [id number]   [facility] 3NE-A Attending Pratik Vidal MD   Hosp Day # 0 PCP Urban Limon MD     Reason for Consultation:  LILLIE/CKD 3/hy calcium gluconate 1 g in sodium chloride 0.9 % 100 mL IVPB, 1 g, Intravenous, Once    No current outpatient prescriptions on file.     Review of Systems:  Denies fever/chills  + weakness  + tremors  Denies wt loss/gain  Denies HA or visual changes  Denies C 04/20/2018    04/20/2018   T4F 1.5 04/20/2018   TSH 0.016 04/20/2018   MG 2.3 04/20/2018   PHOS 4.5 04/20/2018   ETOH <3 04/20/2018         BUN (mg/dL)   Date Value   04/20/2018 19   04/13/2018 23 (H)   04/12/2018 24 (H)   ----------  UREA NITROGEN

## 2018-04-20 NOTE — H&P
[facility]  History and Physical     Cleatrice Plate Patient Status:  Inpatient    1965 MRN [id number]   [facility] 3NE-A Attending Maurisio Fink MD   Hosp Day # 0 PCP Bean Alvarez MD     Chief Complaint: AMS    History of Oral Cap Take 1 capsule (0.25 mcg total) by mouth daily. Disp: 90 capsule Rfl: 3   HYDROcodone-acetaminophen 5-325 MG Oral Tab Take 2 tablets by mouth every 4 (four) hours as needed.  Disp: 30 tablet Rfl: 0   Lithium Carbonate 300 MG Oral Cap Take 600 mg by Clearance: 35.6 mL/min (A) (based on SCr of 2.01 mg/dL (H)). No results for input(s): PTP, INR in the last 72 hours. No results for input(s): TROP, CK in the last 72 hours. Imaging: Imaging data reviewed in Epic. ASSESSMENT / PLAN:     1.  Acu

## 2018-04-21 NOTE — CONSULTS
[facility]  Progress Note    [facility] Patient Status:  Inpatient    1965 MRN [id number]   [facility] 3NE-A Attending Emilee Bell MD   Hosp Day # 1 PCP Timo Castillo MD     Obtunded this am  Rapid response called  Tay distended, non-tender. + bowel sounds, no palpable organomegaly  Extremities: Without clubbing, cyanosis or edema.   Neurologic: sedated  Skin: Warm and dry, no rashes      Recent Labs   04/20/18  0833   WBC 7.3   HGB 10.7*   MCV 90.2   .0         Re

## 2018-04-21 NOTE — PROGRESS NOTES
Lab called this morning with sodium of 170, which was thought to be an error.     CMP ordered to be redrawn  Went into patient's room after lab nishi blood, and patient not responsive  Patient not following commands, but eyes moving, but not tracking, and re

## 2018-04-21 NOTE — PROCEDURES
Called to evaluate patient for intubation. 47year-old male with psychiatric history, electrolyte abnormalities who was noted to have altered mental status and refractory seizure activity. Primary service requests intubation for airway protection.     On

## 2018-04-21 NOTE — CODE DOCUMENTATION
[facility]  RAPID RESPONSE NOTE     Gwenevere Majestic Patient Status:  Inpatient    1965 MRN [id number]   [facility] 3NE-A Attending Dulce Diallo MD   Hosp Day # 1 PCP Elyssa Camacho MD     Reason for RRT: AMS    Physical Exam:

## 2018-04-21 NOTE — PROGRESS NOTES
[facility]  Progress Note     [facility] Patient Status:  Inpatient    1965 MRN [id number]   [facility] 4SW-A Attending Dale Flores MD   Hosp Day # 1 PCP Roberto Gaspar MD     Chief Complaint: AMS    S: Events from this 1. Acute encephalopathy, metabolic from hypernatremia  2. Seizures due to hypernatremia, started on Keppra, ativan PRN, neuro to eval, CT unremarkable  3. Hypernatremia due to DI, on D5W, renal following  4.  Fever, at risk for aspiration, PCT only mild

## 2018-04-21 NOTE — PAYOR COMM NOTE
--------------  ADMISSION REVIEW     Payor: [address] #:  [id number]    Admit date: 4/20/18  Admit time: 1148       Admitting Physician: Ml Page MD  Attending Physician:  Ml Page MD  Primary Care Physician: Mitch George air)    Current:BP (!) 161/102   Pulse 99   Temp 99.3 °F (37.4 °C) (Temporal)   Resp 22   Ht 162.6 cm (5' 4\")   Wt 70.8 kg   SpO2 100%   BMI 26.79 kg/m²          Physical Exam    General appearance:  This is a middle-aged male who is slightly slow to respo DIFFERENTIAL - Abnormal; Notable for the following:     RBC 3.66 (*)     HGB 10.7 (*)     HCT 33.0 (*)     RDW-SD 47.4 (*)     All other components within normal limits   ETHYL ALCOHOL - Normal   DRUG SCREEN 7 W/OUT CONFIRMATION, URINE - Normal    Narrativ Impression:  Lethargy  (primary encounter diagnosis)  Elevated lithium level  Bipolar affective disorder, remission status unspecified (HCC)  Anemia, unspecified type  Acute conjunctivitis of both eyes, unspecified acute conjunctivitis type    Disposition: Medical History:   Diagnosis Date   • Bipolar affective [facility])    • Shortness of breath     with exertion   • Stroke ([facility].) 06/2017    \"mild\" no residual effects   • Visual impairment     glasses for driving        Past Surgical History: Past Surgical Hist Imagin/20 U/S kidney bladder  CONCLUSION:  No evidence of hydronephrosis. Sonographic echotextural changes consistent with renal medical disease.  CT brain or head   CONCLUSION:  No acute process.      chest xray  CONCLUSION:  En could be epileptic in nature     Na still 167  He was given Keppra at 8:45 AM  Will give extra 500 mg IV now and start maintenance 500 mg BID at [address] in AM   Hopefully if NA normalizes, seizure like activity will get better  Calcium rep QTc - likely med/drug toxicity, lyte abnormalities  · Hyperparathyroidism s/p recent parathyroidectomy   · h/o bipolar d/o     PLAN     · Appreciate renal and neurology recommendations: D5W, AED, follow lytes for appropriate correction  · Wean sedation as Kimberly Rodgers RN      levETIRAcetam (KEPPRA) 1,000 mg in sodium chloride 0.9 % 100 mL IVPB     Date Action Dose Route User    4/21/2018 0845 New Bag 1000 mg Intravenous Terrance Jackson RN      LORazepam (ATIVAN) 2 MG/ML injection     Date Action Dose Route User

## 2018-04-21 NOTE — CONSULTS
[address] [facility] with [facility]  4/21/2018    10:30 AM      46 yo who had seizures in the floor    HPI:   He has history of bipolar disorder for which he has been on Li for many years, CKD who had primary h hypernatremia  Renal service notes reviewed    Rapid correction of NA now going on  EEG ordered as subclinical seizure cannot be ruled out   Will treat as arpan James MD  Vascular & General Neurology  Director, Multiple Sclerosis Program  Ed

## 2018-04-21 NOTE — PROGRESS NOTES
[address] [place] [address] with [facility]  4/21/2018    5:18 PM      ADDENDUM progress notes  Intermittent twitching  Not following   Left forced gaze to the left    EEG done this morning showed central and bilateral

## 2018-04-21 NOTE — PROCEDURES
[address] [facility] with [facility]  2018    5:23 PM        Cleatrice Plate  1965    Date of testin2018    Ordering provider:   Swapnil Torres    Reason for testing:  Seizure in a setting of hype

## 2018-04-21 NOTE — CONSULTS
[facility]  Pulmonary/Critical Care Consult Note  [facility]  Report of Consultation    Barksdaleclayton Alcalaing Patient Status:  Inpatient    1965 MRN [id number]   [facility] 4SW-A Attending Emiliana Matthew • dextrose 5% in water  1,000 mL Intravenous Once   • BuPROPion HCl  75 mg Oral Daily   • calciTRIOL  0.25 mcg Oral Daily   • Calcium Carbonate Antacid  1,500 mg Oral TID CC   • OLANZapine  10 mg Oral Nightly   • Tobramycin Sulfate  1 drop Both Eyes Q4H visible lymphadenopathy in neck, axillae, groin  EXT: No overt deformities, No C/C/E, 2+ DP/PT pulses b/l   SKIN: No rashes, ulcers, nodules    Lab Data Review:  Recent Labs   Lab  04/20/18   0833  04/21/18   0741   GLU  96  138*   BUN  19  15   CREATSERUM on 4/21/2018 at 8:15     Approved by: Tessy Cabello MD            Xr Chest Ap Portable  (cpt=71045)    Result Date: 4/21/2018  CONCLUSION:  Endotracheal tube terminates in the right main stem bronchus. Retraction by 5-6 cm is recommended.   Left basilar c

## 2018-04-22 NOTE — PLAN OF CARE
Impaired Functional Mobility    • Achieve highest/safest level of mobility/gait Not Progressing        METABOLIC/FLUID AND ELECTROLYTES - ADULT    • Electrolytes maintained within normal limits Not Progressing        NEUROLOGICAL - ADULT    • Achieves stab

## 2018-04-22 NOTE — PLAN OF CARE
Impaired Functional Mobility    • Achieve highest/safest level of mobility/gait Not Progressing          NEUROLOGICAL - ADULT    • Achieves stable or improved neurological status Not Progressing          METABOLIC/FLUID AND ELECTROLYTES - ADULT    • Glucos

## 2018-04-22 NOTE — PLAN OF CARE
NEUROLOGICAL - ADULT    • Achieves stable or improved neurological status Not Progressing          METABOLIC/FLUID AND ELECTROLYTES - ADULT    • Electrolytes maintained within normal limits Progressing        RESPIRATORY - ADULT    • Achieves optimal venti

## 2018-04-22 NOTE — PLAN OF CARE
Chart reviewed. Communicated with Dr. Prem Chance. HOLDING psych meds (lithium, wellbutrin, zyprexa) since patient was intubated for airway protection due to the seizure from hypernatremia. Li was 1.0 today.  When extubated and able to swallow or if NG tube is p

## 2018-04-22 NOTE — PROGRESS NOTES
[facility]  Progress Note     Davidson Zak Patient Status:  Inpatient    1965 MRN [id number]   [facility] 4SW-A Attending Gerson Gil MD   Hosp Day # 2 PCP Rachell Souza MD     Chief Complaint: AMS    S: Pt remains intub 2.11 mg/dL (H)). No results for input(s): PTP, INR in the last 72 hours. No results for input(s): TROP, CK in the last 72 hours. Imaging: Imaging data reviewed in Epic.     Medications:   • docusate sodium  100 mg Oral BID   • Chlorhexidine Gl

## 2018-04-22 NOTE — CONSULTS
[facility]  Progress Note    Honey Pain Patient Status:  Inpatient    1965 MRN [id number]   [facility] 3NE-A Attending Dani Gamboa MD   Hosp Day # 2 PCP Molly Cheadle, MD     Intubated and sedated  Remains polyuric- dosed 157 lb 13.6 oz   SpO2 100%   BMI 27.09 kg/m²   Temp (24hrs), Av.3 °F (37.4 °C), Min:99.3 °F (37.4 °C), Max:99.3 °F (37.4 °C)       Intake/Output Summary (Last 24 hours) at 18 0551  Last data filed at 18 0500   Gross per 24 hour   Intake Hypernatremia -very rapid onset ; related to DI due to lithium   - continue hypotonic fluids  - will continue use of DDAVP +/- nsaids- so far modest effect in decreasing UOP   - monitor BMP closely     3.  Recent parathyroidectomy - concern for hungry bone

## 2018-04-22 NOTE — PROGRESS NOTES
[facility]  Progress Note    Pattie Palacio Patient Status:  Inpatient    1965 MRN [id number]   [facility] 3NE-A Attending Daniela Denis MD   Hosp Day # 2 PCP Danielle Stein MD     Intubated and sedated  Remains polyuric- dosed Left arm)   Pulse 78   Temp 99 °F (37.2 °C) (Temporal)   Resp 21   Ht 64\"   Wt 157 lb 13.6 oz   SpO2 100%   BMI 27.09 kg/m²   Temp (24hrs), Av.3 °F (37.4 °C), Min:99.3 °F (37.4 °C), Max:99.3 °F (37.4 °C)       Intake/Output Summary (Last 24 hours) at reviewed    Impression/Plan:    1. Acute seizure- suspect related to metabolic derangements   - per neurology    2.  Hypernatremia -very rapid onset ; related to DI due to lithium   - continue hypotonic fluids  - will continue use of DDAVP +/- nsaids- so fa

## 2018-04-22 NOTE — PROGRESS NOTES
[facility] [address]  Neurology  Notes  Affiliated with [facility]  2018, 9:31 AM     Donna Winslow Patient Status:  Inpatient    1965 MRN [id number]   [facility] 4SW-A Attending Radha Garza MD   H (Temporal)   Resp 20   Ht 64\"   Wt 157 lb 13.6 oz   SpO2 100%   BMI 27.09 kg/m²        Intake/Output Summary (Last 24 hours) at 04/22/18 0937  Last data filed at 04/22/18 2675   Gross per 24 hour   Intake             7476 ml   Output             6600 ml calcium  Single seizure, symptomatic due to electrolyte problem    PLAN is to continue Keppra coverage until electrolyte abnormalities are corrected  On Propofol, will intermittently get off it to reexamine neurologically   Initiate appropriate additional

## 2018-04-23 NOTE — PROGRESS NOTES
[facility]  Report of Psychiatric Progress Note    Francisco Maciel Patient Status:  Inpatient    1965 MRN [id number]   [facility] 4SW-A Attending Pablo Arevalo MD   [address] Day # 3 PCP Priscilla Linton MD     Date of Admission:  Interval Hx:  4/21/18- He had a witnessed seizure and was intubated for airway protection. His Na was 173 at the time. Renal thought this was due to diabetes insipidus. Transferred to ICU. Neuro consulted. Loaded with Keppra.  EEG showed no seizure acti Intravenous, Q30 Min PRN  •  acetaminophen (TYLENOL) tab 650 mg, 650 mg, Oral, Q6H PRN **OR** acetaminophen (TYLENOL) 160 MG/5ML oral liquid 650 mg, 650 mg, Oral, Q6H PRN **OR** acetaminophen (TYLENOL) 650 MG rectal suppository 650 mg, 650 mg, Rectal, Q6H solution 1 drop, 1 drop, Both Eyes, Q4H While awake    Review of Systems   Unable to perform ROS: Mental status change     Mental Status Exam:     Objective       04/23/18  0800   BP: 121/58   Pulse: 65   Resp: 20   Temp: 98.3 °F (36.8 °C)     Appearance:

## 2018-04-23 NOTE — PROGRESS NOTES
[id number] [facility] Neurology Progress Note    Екатерина Haynes Patient Status:  Inpatient    1965 MRN [id number]   [facility] 4SW-A Attending Ashlyn Guidry MD   [address] Day # 3 PCP Yanni Breaux MD     Subjective:  Екатерина Haynes is precautions    Dr. Maddy Vences to follow    ROSELIA Sheikh [facility]  Spectra [phone]  Pager [phone]  4/23/2018, 2:23 PM     Neurology Attending Addendum:  I have seen independently, reviewed history, labs and imaging independent of  NP an

## 2018-04-23 NOTE — PLAN OF CARE
METABOLIC/FLUID AND ELECTROLYTES - ADULT    • Electrolytes maintained within normal limits Not Progressing          NEUROLOGICAL - ADULT    • Achieves stable or improved neurological status Not Progressing          RESPIRATORY - ADULT    • Achieves optimal

## 2018-04-23 NOTE — PROGRESS NOTES
[facility]  Progress Note    Usman Cruz Patient Status:  Inpatient    1965 MRN [id number]   [facility] 4SW-A Attending Ivon Henson MD   [facility] Day # 3 PCP Antoni Roberson MD     Subjective:  Usman Ahumadakim is a(n) 47 year activities at present (on propofol).   Lab Data Review:  Recent Labs   Lab  04/20/18   0833  04/22/18   0614  04/23/18   0418   RBC  3.66*  3.87*  3.66*   HGB  10.7*  11.3*  10.4*   HCT  33.0*  36.5*  34.1*   MCV  90.2  94.3  93.2   MCH  29.2  29.2  28.4 as tolerated  · Repeat EKG with improvement in QTc (490 --> 450)  · CXR in laly Yang 27.  Crit care 45  4/23/2018  8:21 AM

## 2018-04-23 NOTE — CM/SW NOTE
Currently in ICU and vented. CM to follow as pt stabilizes and more is known.   Roscoe Oneil, 04/23/18, 3:07 PM

## 2018-04-23 NOTE — PHYSICAL THERAPY NOTE
In more complete chart review this am, noting that pt was transferred from the floor on 4/21/18 at 09:02 due to \" Events from this morning noted, pt more obtunded, unresponsive, seizing, Na elevated, intubated and transferred to ICU. \" - according to

## 2018-04-23 NOTE — PROGRESS NOTES
[facility]  Progress Note     Cary Orr Patient Status:  Inpatient    1965 MRN [id number]   [facility] 4SW-A Attending Dami Wharton MD   Hosp Day # 3 PCP Chas Batista MD     Chief Complaint: AMS    S: Pt   Vented , no --    TP  7.2   --   6.4   --    --    --    --     < > = values in this interval not displayed. Imaging: Imaging data reviewed in Epic.     Medications:   • calcium gluconate  2 g Intravenous TID   • Desmopressin Acetate  2 mcg Subcutaneous BID discharge to: TBD    Plan of care discussed with patient's family, RN   Sergei Rooney, NP     Pt seen and examined, agree with above. No acute events, remains intubated. Na improving. On propofol, lithium to be resumed today.     /68   Pulse 71

## 2018-04-23 NOTE — PROGRESS NOTES
[facility]  Nephrology Progress Note    Usman Cruz Patient Status:  Inpatient    1965 MRN [id number]   [facility] [place] 4SW-A Attending Ivon Henson MD   Hosp Day # 3 PCP Antoni Roberson MD       SUBJECTIVE:  Events of weekend rev 4. 8   GLU  216*  182*  228*  187*  130*       Recent Labs   Lab  04/20/18   0833  04/21/18   0741  04/22/18   0614   ALT  135*  115*  79*   AST  40  27  18   ALB  3.6  3.6  3.1*       Recent Labs   Lab  04/21/18   0804  04/21/18   1153  04/23/18   0010  04 Aspart Pen (NOVOLOG) 100 UNIT/ML flexpen 1-5 Units 1-5 Units Subcutaneous 4 times per day   Heparin Sodium (Porcine) 5000 UNIT/ML injection 5,000 Units 5,000 Units Subcutaneous Q8H [facility]   dextrose 5% infusion  Intravenous Continuous   levETIRAcetam (KEPPRA)

## 2018-04-23 NOTE — OCCUPATIONAL THERAPY NOTE
Orders for OT eval received 4/20/18. Chart reviewed. Patient was transferred to ICU on 4/21/18 and vented d/t acute respiratory failure. Patient needs new orders for PT/OT to eval and treat once medically appropriate. RN aware.

## 2018-04-24 NOTE — PLAN OF CARE
Assumed care of pt at [phone] from previous RN. Pt on ventilator. OG with feedings. Curtis. Propofol for sedation. Pt's mother and aunt at bedside.

## 2018-04-24 NOTE — PROGRESS NOTES
[facility]  Progress Note    Raj Duron Patient Status:  Inpatient    1965 MRN [id number]   [facility] 4SW-A Attending Millie Nelson MD   [facility] Day # 4 PCP More Quispe MD     Subjective:  Raj Duron is a(n) 47 year 93.3   MCH  29.2  28.4  28.0   MCHC  31.0  30.5*  30.0*   RDW  15.8  15.0  15.0   NEPRELIM  9.82*   --   14.18*   WBC  11.9  11.1  15.8*   PLT  203.0  188.0  239.0     Recent Labs   Lab  04/23/18   0418  04/23/18   1432  04/24/18   0507   GLU  130*  180* put out maximally dilute urine at 100 cc/h.   · Continue sedation holidays and follow neurological exam  · follow respiratory status and wean vent as tolerated  · Repeat EKG with improvement in QTc (490 --> 450)  · CXR in am  · Tube feedings to begin    Eladio

## 2018-04-24 NOTE — PROGRESS NOTES
[id number] Liz[facility] Arre[facility] Neurology Progress Note    Cleatrice Plate Patient Status:  Inpatient    1965 MRN [id number]   [facility] 4SW-A Attending Maurisio iFnk MD   [facility] Day # 4 PCP Bean Alvarez MD     Subjective:  Fane Plate is 04/24/2018   INR 1.17 04/24/2018   PTP 15.4 04/24/2018   MG 2.4 04/24/2018   PHOS 3.8 04/24/2018   PGLU 95 04/24/2018     Imaging:  No new imaging  [facility] 4/21/18 - unremarkable  EEG 4/21/18 - moderate diffuse cerebral dysfunction consistent with encephalopat following commands off sedation - he is however withdrawing in all 4 extremities, with reactive pupils and cannot comment on prognosis until at least 72 hrs off sedation continuously - he does have renal dysfunction and mildly elevated WBC as well and meta

## 2018-04-24 NOTE — PROGRESS NOTES
[facility]  Report of Psychiatric Progress Note    Rodneysandytong GloverEverton Patient Status:  Inpatient    1965 MRN [id number]   [facility] 4SW-A Attending Leonel Herman MD   [facility] Day # 4 PCP Courtney Rosado MD     Date of Admission:  to the ED on 4/20 and admitted.      Currently, he says yes to feeling tired, anxious, depressed, and shaky. He is disorganized and slow to speak. No voices, visions, or suicidal ideation.   Per mother, he is less drowsy and less tremulous after getting the sodium chloride 0.9 % 100 mL IVPB, 2 g, Intravenous, TID  •  Pantoprazole Sodium (PROTONIX) 40 mg in Sodium Chloride 0.9 % 10 mL IV push, 40 mg, Intravenous, Q24H  •  fentaNYL citrate (SUBLIMAZE) 0.05 MG/ML injection 25 mcg, 25 mcg, Intravenous, Q30 Min CA UNIT/ML injection 5,000 Units, 5,000 Units, Subcutaneous, Q8H [facility]  •  dextrose 5% infusion, , Intravenous, Continuous  •  levETIRAcetam (KEPPRA) 500 mg in dextrose 5 % 100 mL IVPB, 500 mg, Intravenous, Q12H  •  ondansetron HCl (ZOFRAN) injection 4 mg, 4 mg

## 2018-04-24 NOTE — PROGRESS NOTES
[facility]  Nephrology Progress Note    [facility] Patient Status:  Inpatient    1965 MRN [id number]   [facility] 4SW-A Attending Emilee Bell MD   Hosp Day # 4 PCP Timo Castillo MD       SUBJECTIVE:  Remains unresponsive 6. 6*  6.9*  7.1*  6.5*  7.2*  7.2*   MG  1.8  1.9  1.8  1.6*   --   2.4   PHOS  3.7  4.4  4.7  4.8   --   3.8   GLU  182*  228*  187*  130*  180*  146*       Recent Labs   Lab  04/22/18   0614  04/24/18   0507   ALT  79*  56   AST  18  14*   ALB  3.1*  3.0 Min PRN   Or      dextrose 50 % injection 50 mL 50 mL Intravenous Q15 Min PRN   Or      glucose (DEX4) oral liquid 30 g 30 g Oral Q15 Min PRN   Or      Glucose-Vitamin C (DEX-4) 4-0.006 g chewable tab 8 tablet 8 tablet Oral Q15 Min PRN   Insulin Aspart Pen

## 2018-04-24 NOTE — PLAN OF CARE
NEUROLOGICAL - ADULT    • Achieves stable or improved neurological status Not Progressing        SAFETY ADULT - FALL    • Free from fall injury Not Progressing        Safety Risk - Non-Violent Restraints    • Patient will remain free from self-harm Not Pro

## 2018-04-24 NOTE — PROGRESS NOTES
[facility]  Progress Note     Darriusjorge Smithesperanza Patient Status:  Inpatient    1965 MRN [id number]   [facility] 4SW-A Attending Bg Penny MD   Hosp Day # 4 PCP Cheo Huddleston MD     Chief Complaint: AMS    S: Pt   Vented , no < > = values in this interval not displayed. Imaging: Imaging data reviewed in Epic.     Medications:   • Desmopressin Acetate  3 mcg Subcutaneous BID   • indomethacin  50 mg Oral BID   • hydrochlorothiazide  25 mg Oral Daily   • calcium glucona TBD  Discharge is dependent on: progress  At this point Mr. Lety Martin is expected to be discharge to: TBD    Plan of care discussed with  RN   Jessica Dye, NP     Pt seen and examined, agree with above.   Remains intubated, not responsive or following co

## 2018-04-24 NOTE — PLAN OF CARE
Pt received on full vent support, sedated on propofol. Sedation holiday initiated, breathing trial in place per RT. Patient not following commands, does not open eyes. Non-purposeful Movement noted in all four extremities.   No seizure activity or twitch

## 2018-04-25 NOTE — PLAN OF CARE
No movement to painful stimulus but does fight to keep eyes closed when assessing pupils; Tamela Neuro APN notified.

## 2018-04-25 NOTE — PROGRESS NOTES
[facility]  Progress Note     Noble Coronado Patient Status:  Inpatient    1965 MRN [id number]   [facility] 4SW-A Attending  Ling Vaughn MD   Hosp Day # 5 PCP Sammy Ambrose MD     Chief Complaint: AMS    S: Pt   Vented ,  Inte --    --   0.9   TP   --   7.0   --    --   6.3          Imaging: Imaging data reviewed in Epic.     Medications:   • calcium gluconate  2 g Intravenous TID   • Desmopressin Acetate  3 mcg Subcutaneous BID   • indomethacin  50 mg Oral BID   • hydrochlorothi status: Full  · Curtis: yes   · Central line: no    Estimated date of discharge: TBD  Discharge is dependent on: progress  At this point Mr. Marcela Chapman is expected to be discharge to: TBD    Plan of care discussed with  DANIELLA Kelly NP     Addendum:

## 2018-04-25 NOTE — PROGRESS NOTES
[id number] Liz[facility] Arreola Neurology Progress Note    Raj Duron Patient Status:  Inpatient    1965 MRN [id number]   [facility] 4SW-A Attending Randy Parker MD   [facility] Day # 5 PCP More Quispe MD     Subjective:  Raj Duron is 1.8 04/25/2018   PHOS 4.2 04/25/2018   PGLU 148 04/25/2018     Imaging:  No new imaging     [facility] 4/21/18 - no acute process    EEG 4/21/18 - moderate diffuse cerebral dysfunction consistent with encephalopathy    Impression:  Toxic Metabolic Encephalopathy 2300 93/57 - - 67 28 98 % -   04/24/18 2200 94/49 - - 69 24 98 % -   04/24/18 2100 118/66 - - 70 27 99 % -   04/24/18 2000 99/85 98.3 °F (36.8 °C) - 68 27 99 % -   04/24/18 1800 107/63 - - 74 26 99 % -   04/24/18 1700 115/57 97.8 °F (36.6 °C) Temporal 66 2 no epileptiform activity, clinical or electrographic seizures noted on this record despite noting periods of fluctuating levels of responsiveness.         Assessment/Plan:     Toxic Metqabolic Encephalopathy  Isolated Seizure episode  Hypernatremia   LILLIE/CK

## 2018-04-25 NOTE — PROGRESS NOTES
Neurology Attending Addendum:  I have seen independently, reviewed history, labs and imaging independent of  NP and agree with above note with following additions:   S: no further twitching episodes; off sedation, had EEG - remains intubated and not foll fluttering or twitching; withdraws minimally in all 4 extremities;; DTR appear symmetric and toes downgiong with no clonus; does not follow any commands but grimaces to pain and withdraws     Imaging: no new imaging; prior imaging as noted above    EEG (4/ treat potential infectious / metabolic abnormalities     Keppra level pending       Plan:  Continue weaning sedation as tolerated  Continue Keppra 500 mg bid - repeat Na this PM - if remains normal, likely wean to 250 mg bid in AM or this evening - if stil

## 2018-04-25 NOTE — PROGRESS NOTES
[facility]  Report of Psychiatric Progress Note    Honey Pain Patient Status:  Inpatient    1965 MRN [id number]   [facility] 4SW-A Attending Dani Gamboa MD   [facility] Day # 5 PCP Molly Cheadle, MD     Date of Admission:  the IVF's. Interval Hx:  4/21/18- He had a witnessed seizure and was intubated for airway protection. His Na was 173 at the time. Renal thought this was due to diabetes insipidus. Transferred to ICU. Neuro consulted. Loaded with Keppra.  EEG showed no s Oral, BID  •  hydrochlorothiazide (HYDRODIURIL) tab 25 mg, 25 mg, Oral, Daily  •  Pantoprazole Sodium (PROTONIX) 40 mg in Sodium Chloride 0.9 % 10 mL IV push, 40 mg, Intravenous, Q24H  •  fentaNYL citrate (SUBLIMAZE) 0.05 MG/ML injection 25 mcg, 25 mcg, In Subcutaneous, Q8H [facility]  •  dextrose 5% infusion, , Intravenous, Continuous  •  levETIRAcetam (KEPPRA) 500 mg in dextrose 5 % 100 mL IVPB, 500 mg, Intravenous, Q12H  •  ondansetron HCl (ZOFRAN) injection 4 mg, 4 mg, Intravenous, Q6H PRN  •  Tobramycin Sulfat

## 2018-04-25 NOTE — DIETARY NOTE
NUTRITION INITIAL ASSESSMENT    Pt is at high nutrition risk. Pt does not meet malnutrition criteria.     NUTRITION DIAGNOSIS/PROBLEM:    Inadequate oral intake related to inability to consume sufficient energy as evidenced by intubation/Tubefeeding(current is noted to be elevated. ANTHROPOMETRICS:  Ht: 162.6 cm (5' 4\")  Wt: 73.4 kg (161 lb 13.1 oz). This is  % of IBW  BMI: Body mass index is 27.78 kg/m².   IBW: 59 kg  Usual Body Wt: 70-73 kg    WEIGHT HISTORY:   Wt Readings from Last 6 Encounters:  04/25/

## 2018-04-25 NOTE — PAYOR COMM NOTE
--------------  CONTINUED STAY REVIEW    Payor: [address] [place] [address] #:  [id number]  Authorization Number: [id number]    Admit date: 4/20/18  Admit time: 1    Admitting Physician: Filiberto Fabian MD  Attending Physician:  Ab Harper MD ICU - INTUBATED    /58   Pulse 65   Temp 98.3 °F (36.8 °C) (Temporal)   Resp 20   Ht 64\"   Wt 162 lb 7.7 oz   SpO2 100%   BMI 27.89 kg/m²   Temp (24hrs), Av.5 °F (36.9 °C), Min:98 °F (36.7 °C), Max:99.1 °F (37.3 °C)      18   0833   173)    Current complaints: no purposeful movement observed while off sedation by nursing or family. Also no seizure like movement observed while off sedation. Unable to wean r/t mental status.   Placed back on sedation for restlessness and increased work chewable tab 1,500 mg     Date Action Dose Route User    4/24/2018 2000 Given Other 1500 mg Per NG Tube Eleni Cleveland, RN    4/24/2018 1316 Given 1500 mg Per NG Tube Edmundo Orozco, RN    4/24/2018 0901 Given 1500 mg Per NG Tube Abilio Murrieta Verify (none) Intravenous Anai Williamson RN    4/24/2018 0830 Rate/Dose Change (none) Intravenous Anai Williamson RN         Heparin Sodium (Porcine) 5000 UNIT/ML injection 5,000 Units     Date Action Dose Route User    4/25/2018 0558 Given 5000 Units mcg/kg/min × 70.8 kg Intravenous Makayla Ann RN    2/85/0821 0050 Rate/Dose Change 35 mcg/kg/min × 70.8 kg Intravenous Makayla Ann RN    8/96/0667 0040 Rate/Dose Change 30 mcg/kg/min × 70.8 kg Intravenous Makayla Ann RN    4/2

## 2018-04-25 NOTE — PROGRESS NOTES
[facility]  Nephrology Progress Note    Rafy Nieto Patient Status:  Inpatient    1965 MRN [id number]   [facility] 4SW-A Attending Leslee Frausto MD   Hosp Day # 5 PCP Dottie Moeller MD       SUBJECTIVE:  Overnight events note 13.0*  19.0*  19.0*  20.0*   --    --   19.0*   BUN  8  8  8  9  13   --    --   18   CREATSERUM  1.93*  1.92*  1.81*  1.93*  1.97*   --    --   2.03*   CA  6.9*  7.1*  6.5*  7.2*  7.2*   --    --   6.5*   MG  1.9  1.8  1.6*   --   2.4   --    --   1.8   P Antacid (TUMS) chewable tab 1,500 mg 1,500 mg Per NG Tube TID CC   Lithium Carbonate cap 600 mg 600 mg Per NG Tube Nightly   BuPROPion HCl (WELLBUTRIN) tab 75 mg 75 mg Per NG Tube Daily   calcitRIOL (ROCALTROL) solution 0.5 mcg 0.5 mcg Oral Daily   glucose past few weeks. Previous creat had been closer to 1.6 mg/dl or so. Cont to follow. D/W family at bedside    Questions/concerns were discussed with patient and/or family by bedside.         Baron Berna MD  4/25/2018  9:09 AM

## 2018-04-25 NOTE — PROCEDURES
[address] EEG report    Name: Donna Winslow    Date of Study: 4/25/2018      Routine EEG Report    Ordering Physician: Modena Phalen, MD                               Primary Care Physician: Stacie Markham MD    Technical A magnesium hydroxide (MILK OF MAGNESIA) 400 MG/5ML suspension 30 mL 30 mL Oral Daily PRN   bisacodyl (DULCOLAX) rectal suppository 10 mg 10 mg Rectal Daily PRN   FLEET ENEMA (FLEET) 7-19 GM/118ML enema 133 mL 1 enema Rectal Once PRN   Chlorhexidine Glucon levETIRAcetam (KEPPRA) 1,000 mg in sodium chloride 0.9 % 100 mL IVPB 1,000 mg Intravenous Once   [COMPLETED] LORazepam (ATIVAN) 2 MG/ML injection      [COMPLETED] etomidate (AMIDATE) 2 MG/ML solution      [COMPLETED] succinylcholine chloride (ANECTINE) 20 intermittent, irregular, occasional 40-60 uV, 2-3 Hz delta slowing, maximal over the anterior head regions. During times of stimulation, scant, poorly modulated posterior dominant background rhythm of 20-40 uV, ~7 Hz theta/alpha rhythm was noted.   Reac

## 2018-04-25 NOTE — PLAN OF CARE
NEUROLOGICAL - ADULT    • Achieves stable or improved neurological status Not Progressing          Patient/Family Goals    • Patient/Family Long Term Goal Not Progressing    • Patient/Family Short Term Goal Not Progressing          RESPIRATORY - ADULT    •

## 2018-04-25 NOTE — CONSULTS
[facility] dosing service    Initial Pharmacokinetic Consult for Vancomycin Dosing     Rodger Martinez is a 47year old male admitted on 4/20/18 who is being treated for pneumonia.   Pharmacy has been asked to dose Vancomycin by Dr. Saúl Wagner.    He 4th dose. Goal trough level 15-20 ug/mL. 3.  Pharmacy will need BUN/Scr daily while on Vancomycin to assess renal function. 4.  Pharmacy will follow and monitor renal function changes, toxicity and efficacy. Pharmacy will continue to follow him.

## 2018-04-25 NOTE — PLAN OF CARE
1805: Dr. Malathi Padilla Sr paged regarding sputum culture results. Aggressive PO care every 4 hours as per protocol. Pt remains off of propofol since 0725 this AM.   RN able to elicit movement to painful stimulus from [facility] this afternoon but not from [address] [place] [address].

## 2018-04-25 NOTE — PROGRESS NOTES
[facility]  Progress Note    Cory High Patient Status:  Inpatient    1965 MRN [id number]   [facility] 4SW-A Attending Nellie Machuca MD   [facility] Day # 5 PCP Urban Limon MD     Subjective:  Cory High is a(n) 47 year 3.66*  4.04*  3.46*   HGB  10.4*  11.3*  10.0*   HCT  34.1*  37.7  32.0*   MCV  93.2  93.3  92.5   MCH  28.4  28.0  28.9   MCHC  30.5*  30.0*  31.3   RDW  15.0  15.0  14.6   NEPRELIM   --   14.18*  11.18*   WBC  11.1  15.8*  13.7*   PLT  188.0  239.0  196 on hold.   · Prolonged QTc - likely med/drug toxicity, lyte abnormalities  · Hyperparathyroidism s/p recent parathyroidectomy-now with a low ionized calcium-receiving calcium supplementation and repeat ionized calcium pending  · h/o bipolar d/o     PLAN  ·

## 2018-04-26 NOTE — PAYOR COMM NOTE
--------------  CONTINUED STAY REVIEW    Payor: [address] #:  [id number]  Authorization Number: [id number]    Admit date: 4/20/18  Admit time: 1    Admitting Physician: Kate Lang MD  Attending Physician:  Meño Suárez MD with a low ionized calcium-receiving calcium supplementation and repeat ionized calcium pending      PLAN  · Both Indocin and DDAVP and calcium therapy lapsing-renal to address on rounds.     · Continue sedation holidays and follow neurological exam off sed sedation, cannot comment on prognosis until at least 72 hours off sedation continuously  Likely decrease Keppra 250 mg BID, currently on 500 mg BID  Continue seizure precautions  [address], Renal, following                 -Zosyn started for pna                - (HYDRODIURIL) tab 25 mg     Date Action Dose Route User    4/26/2018 0849 Given 25 mg Oral Madonna Schneider, RN      indomethacin (INDOCIN) cap 50 mg     Date Action Dose Route User    4/26/2018 0849 Given 50 mg Oral Rolando Terry RN    4/25/2018 2

## 2018-04-26 NOTE — PROGRESS NOTES
[facility]  Report of Psychiatric Progress Note    Cory High Patient Status:  Inpatient    1965 MRN [id number]   [facility] 4SW-A Attending Pratik Vidal MD   [facility] Day # 6 PCP Gayla Ramirez MD     Date of Admission:  and less tremulous after getting the IVF's. Interval Hx:  4/21/18- He had a witnessed seizure and was intubated for airway protection. His Na was 173 at the time. Renal thought this was due to diabetes insipidus. Transferred to ICU. Neuro consulted.  Priscila Intravenous, Q8H  •  levETIRAcetam (KEPPRA) 250 mg in dextrose 5 % 100 mL IVPB, 250 mg, Intravenous, Q12H  •  calcium gluconate 2 g in dextrose 5 % 100 mL IVPB, 2 g, Intravenous, TID  •  Normal Saline Flush 0.9 % injection 10 mL, 10 mL, Intravenous, PRN  • chewable tab 4 tablet, 4 tablet, Oral, Q15 Min PRN **OR** dextrose 50 % injection 50 mL, 50 mL, Intravenous, Q15 Min PRN **OR** glucose (DEX4) oral liquid 30 g, 30 g, Oral, Q15 Min PRN **OR** Glucose-Vitamin C (DEX-4) 4-0.006 g chewable tab 8 tablet, 8 tab

## 2018-04-26 NOTE — PROGRESS NOTES
[id number] [facility] Neurology Progress Note    Amaya Mitchell Patient Status:  Inpatient    1965 MRN [id number]   [facility] 4SW-A Attending Ab Harper MD   [address] Day # 6 PCP Darcie Kay MD     Subjective:  Amaya Mitchell is Testing:  EEG 4/25/18  This EEG is abnormal due to diffuse continuous moderate slowing. This is suggestive of moderate global cerebral dysfunction or moderate encephalopathy.   There was no epileptiform activity, clinical or electrographic seizures noted on 04/26/18 0336 - 98.1 °F (36.7 °C) Temporal 68 22 98 % -   04/26/18 0300 99/57 - - 68 23 98 % -   04/26/18 0200 108/53 - - 68 22 97 % -   04/26/18 0100 - - - 72 21 99 % -   04/26/18 0000 104/54 97.9 °F (36.6 °C) Temporal 72 23 99 % 158 lb 11.7 oz   04/25/    Hyperventilation and photic stimulation were not performed      Epileptiform activity: None     Seizures: none     Events: none     Impression: This EEG is abnormal due to diffuse continuous moderate slowing.       This is suggestive of moderate glob

## 2018-04-26 NOTE — PROGRESS NOTES
[facility]  Progress Note    Pattie Palacio Patient Status:  Inpatient    1965 MRN [id number]   [facility] 4SW-A Attending German Solorio MD   [address] Day # 6 PCP Danielle Stein MD     Subjective:  Pattie Palacio is a(n) 47 year PTT  32.6       Cultures: Sputum with sensitive staph aureus    Radiology:  Left base continues to improve  Right base slight increase in atelectatic type changes ET tube noted      Medications reviewed     Assessment and Plan:   Patient Active Problem L cover for possible aspiration pneumonia sensitive staph aureus  Plan to continue full ventilatory support  Resume nutrition  Further evaluation as per neurology service  Renal input much appreciated  Discussed at length with the patient's daughter and gran

## 2018-04-26 NOTE — PROGRESS NOTES
[facility]  Nephrology Progress Note    Cyndi Kai Patient Status:  Inpatient    1965 MRN [id number]   [facility] 4SW-A Attending Taniya Gannon MD   [facility] Day # 6 PCP Manoj Rawls MD       SUBJECTIVE:  No acute events, willie CO2  19.0*  20.0*   --    --   19.0*   --    --   20.0*   BUN  9  13   --    --   18   --    --   20   CREATSERUM  1.93*  1.97*   --    --   2.03*   --    --   1.86*   CA  7.2*  7.2*   --    --   6.5*   --    --   6.5*   MG   --   2.4   --    --   1.8 OF MAGNESIA) 400 MG/5ML suspension 30 mL 30 mL Oral Daily PRN   bisacodyl (DULCOLAX) rectal suppository 10 mg 10 mg Rectal Daily PRN   FLEET ENEMA (FLEET) 7-19 GM/118ML enema 133 mL 1 enema Rectal Once PRN   Chlorhexidine Gluconate (PERIDEX) 0.12 % solutio rate d5w. #2.  Seizure- likely due to #1. Remains intubated. Ongoing f/u per neuro    #3. Hypocalcemia- most c/w hungry bone. Cont to replace ca and cont calcitriol as well    #4. LILLIE/CKD 3- creat somewhat better today.   Baseline last fall had been

## 2018-04-26 NOTE — PROGRESS NOTES
[facility]  Progress Note     Caldwell Cushing Patient Status:  Inpatient    1965 MRN [id number]   [facility] 4SW-A Attending  Jadon Goldman MD   Hosp Day # 6 PCP Yakelin Kim MD     Chief Complaint: AMS    S: Pt   Vented , off s 6.3   --    --   6.5    < > = values in this interval not displayed. Imaging: Imaging data reviewed in Epic.     Medications:   • piperacillin-tazobactam  3.375 g Intravenous Q8H   • calcium gluconate  2 g Intravenous TID   • indomethacin  50 mg Or · Central line: PICC r arm    Estimated date of discharge: TBD  Discharge is dependent on: progress  At this point Mr. Mavis Perdomo is expected to be discharge to: TBD    Plan of care discussed with  RN family   Shalini Mistry NP     Addendum:    pt seen a

## 2018-04-27 NOTE — PLAN OF CARE
Assumed care of patient at 0700. Orally intubated and mechanically ventilated. Remains off sedation. +cough, gag. EARLINE. Does not follow commands. Non-purposeful movement of feet. OGT. Tube feeds infusing at 10mL/hr, increased to 15mL/hr per order.  Residual

## 2018-04-27 NOTE — PROGRESS NOTES
[facility]  Nephrology Progress Note    Darrius Dietrich Patient Status:  Inpatient    1965 MRN [id number]   [facility] 4SW-A Attending Bg Penny MD   [facility] Day # 7 PCP Chante Rosario MD       SUBJECTIVE:  No acute events, willie --   19.0*   BUN  18   --    --   20   --    --   33*   CREATSERUM  2.03*   --    --   1.86*   --    --   2.38*   CA  6.5*   --    --   6.5*   --    --   6.7*   MG  1.8   --    --   2.0   --    --   2.3   PHOS  4.2   --    --   4.4   --    --   4.7   GLU mg 10 mg Rectal Daily PRN   FLEET ENEMA (FLEET) 7-19 GM/118ML enema 133 mL 1 enema Rectal Once PRN   Chlorhexidine Gluconate (PERIDEX) 0.12 % solution 15 mL 15 mL Mouth/Throat [email or web address]   Calcium Carbonate Antacid (TUMS) chewable tab 1,500 mg 1,500 mg c/w hungry bone. Cont to replace ca and cont calcitriol as well    #4. LILLIE/CKD 3- creat somewhat worse today. Baseline last fall had been closer to 1.6 mg/dl.  Will d/c indocin and follow    D/W family at bedside      Peter Mazariegos MD  4/26/2018  8

## 2018-04-27 NOTE — PLAN OF CARE
GASTROINTESTINAL - ADULT    • Maintains or returns to baseline bowel function Not Progressing          NEUROLOGICAL - ADULT    • Achieves stable or improved neurological status Not Progressing          METABOLIC/FLUID AND ELECTROLYTES - ADULT    • Electrol

## 2018-04-27 NOTE — PROGRESS NOTES
[id number] Liz[facility] Arreola Neurology Progress Note    [place] Diogo Patient Status:  Inpatient    1965 MRN [id number]   [facility] 4SW-A Attending Meño Suárez MD   [facility] Day # 7 PCP Leobardo Cuellar MD     Subjective:  Grant Lind is 04/27/2018   PHOS 4.7 04/27/2018   PGLU 124 04/27/2018     Imaging:  MRI Brain 4/26/18  CONCLUSION:    No acute intracranial abnormality    EEG 4/25/18 - suggestive of moderate global cerebral dysfunction or moderate encephalopathy.  No seizure activity

## 2018-04-27 NOTE — DIETARY NOTE
NUTRITION FOLLOW UP ASSESSMENT    Pt is at high nutrition risk. Pt does not meet malnutrition criteria.     NUTRITION DIAGNOSIS/PROBLEM:    Inadequate oral intake related to inability to consume sufficient energy as evidenced by intubation/Tubefeeding - con ago presents to the ER with complaints of altered mental status. Upon admission, his calcium level was noted to be low and his lithium level is noted to be elevated. ANTHROPOMETRICS:  Ht: 162.6 cm (5' 4\")  Wt: 71.1 kg (156 lb 12 oz).  This is  % of IBW

## 2018-04-27 NOTE — PAYOR COMM NOTE
--------------  CONTINUED STAY REVIEW    Payor: [id number] [place] [address] #:  [id number]  Authorization Number: [id number]    Admit date: 4/20/18  Admit time: 1    Admitting Physician: Naomie Koehler MD  Attending Physician:  Sera Ireland MD respiratory tract infection     Radiology: Chest x-ray without significant change-tubes remain in position  ASSESSMENT  · Altered mental status - acutely worsened by possible seizure and/or hypernatremia; CT head negative. -Now off sedation × 48 hours-. -EE short course of indocin. Polyuria has sig improved and Na is actually modestly low now. Will back off D5W. D/c indocin with incr creat. Back off DDAVP and follow  #2. Seizure- likely due to #1. Remains intubated. Ongoing f/u per neuro   #3.  Hypocalc dextrose 5 % 100 mL IVPB     Date Action Dose Route User    4/27/2018 0903 New Bag 2 g Intravenous Jesenia Vasquez, DANIELLA    4/26/2018 2100 New Bag 2 g Intravenous Cierra Cleveland, RN    4/26/2018 1645 New Bag 2 g Intravenous Martin Cooney, RN      Chl levETIRAcetam (KEPPRA) 250 mg in dextrose 5 % 100 mL IVPB     Date Action Dose Route User    4/27/2018 0903 New Bag 250 mg Intravenous Barbara Moreno RN    4/26/2018 2100 New Bag 250 mg Intravenous Tory Cleveland, RN      Lithium Carbonate cap 600 m

## 2018-04-27 NOTE — PROGRESS NOTES
[facility]  Progress Note    Lola Stallings Patient Status:  Inpatient    1965 MRN [id number]   [facility] 4SW-A Attending Hua Osman MD   [facility] Day # 7 PCP Doroteo Torres MD     Subjective:  Lola Stallings is a(n) 47 year 89.3   MCH  28.9  28.9  28.7   MCHC  31.3  32.1  32.1   RDW  14.6  14.3  14.3   NEPRELIM  11.18*  11.65*  8.72*   WBC  13.7*  13.8*  11.2   PLT  196.0  216.0  221.0     Recent Labs   Lab  04/25/18   0420   04/26/18   0348  04/26/18   1450  04/26/18 2040 staph and chest x-ray with possible aspiration pneumonia  · Prolonged QTc - likely med/drug toxicity, lyte abnormalities-no clinical issues  · Hyperparathyroidism s/p recent parathyroidectomy-now with a low ionized calcium-receiving calcium supplementation

## 2018-04-27 NOTE — PROGRESS NOTES
[facility]  Progress Note     Donna Aileen Patient Status:  Inpatient    1965 MRN [id number]   [facility] 4SW-A Attending  Audrey Durham MD   Hosp Day # 7 PCP Jacques Amos MD     Chief Complaint: AMS    S: Pt   Vented , off s 19.0*   ALKPHO  186*   --   194*   --    --   336*   AST  16   --   17   --    --   56*   ALT  43   --   38   --    --   68*   BILT  0.9   --   1.3   --    --   1.2   TP  6.3   --   6.5   --    --   6.3    < > = values in this interval not displayed. this afternoon at lower rate,    13. Superficial thrombophlebitis right mid cephalic vein. No DVTs in UR  14.  Anemia  Slow drift down     Plan of care:   Dec DDAVP  Stop indocin  Fluid bolus for hypotension- occurred when pt placed in chair position  Corre

## 2018-04-27 NOTE — PLAN OF CARE
GASTROINTESTINAL - ADULT    • Maintains or returns to baseline bowel function Progressing          NEUROLOGICAL - ADULT    • Achieves stable or improved neurological status Not Progressing        Received patient this am unresponsive on ventilator.  Isiah Stratton a

## 2018-04-28 NOTE — PLAN OF CARE
1930: Rec'd report from previous RN, care assumed, pt assessed per flowsheets. Pt resting in bed, vitals stable, no acute distress noted. Pt not following commands, though at times appears to be attempting to open eyes to command.   Pt moves all extremiti Patient/Family Long Term Goal Progressing    • Patient/Family Short Term Goal Progressing        RESPIRATORY - ADULT    • Achieves optimal ventilation and oxygenation Progressing        SAFETY ADULT - FALL    • Free from fall injury Progressing        Safe

## 2018-04-28 NOTE — PROCEDURES
Date of Procedure: 4/28/2018    Procedure: EEG (ELECTROENCEPHALOGRAM)     DX: AMS  HX: 53 Y/O MALE THAT PRESENTED FOR EVALUATION FOLLOWING SEIZURE ON 4/21/2018 AND FOUND TO BE HYPERNATREMIC. PT S/P PARATHYROIDECTOMY ON 4/12.   WHILE IN HOUSE PT HAD RESPIRA

## 2018-04-28 NOTE — PROGRESS NOTES
[facility]  Progress Note     Pavan Luciano Patient Status:  Inpatient    1965 MRN [id number]   [facility] 4SW-A Attending Corey Pratt MD   Hosp Day # 8 PCP Brianna Corona MD     Chief Complaint: AMS    S: Patient remains Clearance: 32.2 mL/min (A) (based on SCr of 2.22 mg/dL (H)). No results for input(s): PTP, INR in the last 72 hours. No results for input(s): TROP, CK in the last 72 hours. Imaging: Imaging data reviewed in Epic.     Medications:   • calcium g cephalic vein. No DVTs in UR  14.  Anemia  - stable       Quality:  · DVT Prophylaxis: Lovenox  · CODE status: Full  · Curtis: yes   · Central line: PICC r arm     Estimated date of discharge: TBD  Discharge is dependent on: progress  At this point Mr. Adela Rodriguez

## 2018-04-28 NOTE — PROGRESS NOTES
[facility]  Nephrology Progress Note    Francisco Maciel Attending:  Vicki Kelley MD       Assessment and Plan:    1) Hypernatremia- rapid onset due to underlying lithium-induced nephrogenic DI combined with lack of free water intake leading to sei Warm and dry, no rashes       Labs:     Lab Results  Component Value Date   WBC 12.6 04/28/2018   HGB 8.5 04/28/2018   HCT 26.6 04/28/2018   .0 04/28/2018   CREATSERUM 2.22 04/28/2018   BUN 34 04/28/2018    04/28/2018   K 3.8 04/28/2018   CL 1 ENEMA (FLEET) 7-19 GM/118ML enema 133 mL 1 enema Rectal Once PRN   Chlorhexidine Gluconate (PERIDEX) 0.12 % solution 15 mL 15 mL Mouth/Throat [email or web address]   Calcium Carbonate Antacid (TUMS) chewable tab 1,500 mg 1,500 mg Per NG Tube TID CC   Lithium Carbo

## 2018-04-28 NOTE — PROGRESS NOTES
459 CT to be that he is 6+ cm above I think [facility]  Progress Note    Noble Coronado Patient Status:  Inpatient    1965 MRN [id number]   [facility] 4SW-A Attending Corky Camacho MD   Hosp Day # 8 PCP Sammy Ambrose MD     Sub PTP, INR, PTT in the last 72 hours.     Cultures: Sputum was sensitive staph blood cultures remain negative    Radiology:  ET tube noted, mild bibasilar infrahilar opacities seem to wax and wane      Medications reviewed     Assessment and Plan:   Patient A supplementation and repeat ionized calcium pending  · h/o bipolar d/o       discussed with his mother at length  Continuing to follow on full ventilatory support-intubated 4/21  As ordered  ET tube will be pushed in 1.5 cm    Carlos Selby MD  4/28/2018

## 2018-04-28 NOTE — PROGRESS NOTES
INPATIENT NEUROLOGY PROGRESS NOTE    Date: 4/28/2018    Rafy Nieto is a 47year old male at Hospital Day ( LOS: 8 days )    Impression:  Altered Mental status - likely multifactor toxic Metabolic Encephalopathy (lithium toxicity, hypernatremia, hypo injection 50 mcg 50 mcg Intravenous Q30 Min PRN   acetaminophen (TYLENOL) tab 650 mg 650 mg Oral Q6H PRN   Or      acetaminophen (TYLENOL) 160 MG/5ML oral liquid 650 mg 650 mg Oral Q6H PRN   Or      acetaminophen (TYLENOL) 650 MG rectal suppository 650 mg Examination:  Mental status: This patient is intubated, obtunded  Unable to follow commands. PERRLA +4 brisk. No deviation noted. Slight roving eye movement  Intact corneal reflex. Does not open eyes spontaneously   Intact gag reflex. Motor:   With

## 2018-04-29 NOTE — PROGRESS NOTES
[facility]  Report of Psychiatric Progress Note    Rafynilda Titustz Patient Status:  Inpatient    1965 MRN [id number]   [facility] 4SW-A Attending Leslee Frausto MD   [facility] Day # 8 PCP Dottie Moeller MD     Date of Admission:  tired, anxious, depressed, and shaky. He is disorganized and slow to speak. No voices, visions, or suicidal ideation. Per mother, he is less drowsy and less tremulous after getting the IVF's.      Interval Hx:  4/21/18- He had a witnessed seizure and was i he has never smoked. He has never used smokeless tobacco. He reports that he does not drink alcohol or use drugs.     Allergies:  No Known Allergies    Medications:    Current Facility-Administered Medications:   •  [START ON 4/29/2018] Lithium Carbonate ca calcitRIOL (ROCALTROL) solution 0.5 mcg, 0.5 mcg, Oral, Daily  •  glucose (DEX4) oral liquid 15 g, 15 g, Oral, Q15 Min PRN **OR** Glucose-Vitamin C (DEX-4) 4-0.006 g chewable tab 4 tablet, 4 tablet, Oral, Q15 Min PRN **OR** dextrose 50 % injection 50 mL, 5

## 2018-04-29 NOTE — PLAN OF CARE
Resumed care at 0700. Remains orally intubated and mechanically ventilated. Off sedation. Withdraws from painful stimuli, moves all extremities non-purposefully. Does not respond to commands. +Cough, gag. EARLINE with nystagmus.  Hemodynamically stable, low gr

## 2018-04-29 NOTE — PROGRESS NOTES
[facility]  Nephrology Progress Note    Charmaine Menchaca Attending:  Sabrina Lawson MD       Assessment and Plan:    1) Hypernatremia- rapid onset due to underlying lithium-induced nephrogenic DI combined with lack of free water intake leading to sei grossly intact, moving all extremities  Skin: Warm and dry, no rashes       Labs:     Lab Results  Component Value Date   WBC 13.6 04/29/2018   HGB 8.3 04/29/2018   HCT 26.6 04/29/2018   .0 04/29/2018   CREATSERUM 2.27 04/29/2018   BUN 32 04/29/2018 30 mL Oral Daily PRN   bisacodyl (DULCOLAX) rectal suppository 10 mg 10 mg Rectal Daily PRN   FLEET ENEMA (FLEET) 7-19 GM/118ML enema 133 mL 1 enema Rectal Once PRN   Chlorhexidine Gluconate (PERIDEX) 0.12 % solution 15 mL 15 mL Mouth/Throat [email or web address]

## 2018-04-29 NOTE — PROGRESS NOTES
[facility]  Progress Note    Donna Gallagher Patient Status:  Inpatient    1965 MRN [id number]   [facility] 4SW-A Attending Will Oneill MD   [facility] Day # 9 PCP Noemi Harris MD     Subjective:  Donna Gallagher is a(n) 47 year the last 72 hours.     Cultures: Repeat sputum remains with heavy WBCs culture pending    Radiology:  Chest x-ray was slight increased basilar infiltrates no evidence significant effusion      Medications reviewed     Assessment and Plan:   Patient Active P parathyroidectomy-now with a low ionized calcium-receiving calcium supplementation and repeat ionized calcium pending  · Elevated LFTs mild  · h/o bipolar d/o       Discussed at length with patient's daughter and mother at bedside as well as Dr. Ami Ya and

## 2018-04-29 NOTE — PLAN OF CARE
Resumed care of patient at 0700. Intubated, remains on ventilator. No sedation. Small amount inline secretions , lg amount oral secretions clear/tan, thin. CPAP trial in AM x1H and tolerated well.  Patient to remain on CPAP as long tolerated per Dr. Rahul Dietz

## 2018-04-29 NOTE — PROGRESS NOTES
INPATIENT NEUROLOGY PROGRESS NOTE    Date: 4/29/2018    Екатерина Haynes is a 47year old male at Hospital Day ( LOS: 9 days )    Impression:  Altered Mental status - likely multifactor toxic Metabolic Encephalopathy (lithium toxicity, hypernatremia, hypo injection 50 mcg 50 mcg Intravenous Q30 Min PRN   acetaminophen (TYLENOL) tab 650 mg 650 mg Oral Q6H PRN   Or      acetaminophen (TYLENOL) 160 MG/5ML oral liquid 650 mg 650 mg Oral Q6H PRN   Or      acetaminophen (TYLENOL) 650 MG rectal suppository 650 mg open eyes spontaneously   Intact gag reflex.    Motor:  more spontaneous movement noted  Babinski: Bilateral toes down going.     Labs and imaging/Diagnostic studies on 4/29/2018      Aisha Engle MD   Neurology  [facility]

## 2018-04-29 NOTE — PROGRESS NOTES
[facility]  Progress Note     Lola Haylie Patient Status:  Inpatient    1965 MRN [id number]   [facility] 4SW-A Attending Hua Osman MD   Hosp Day # 5 PCP Doorteo Torres MD     Chief Complaint: intubated    S: Patient st Imaging: Imaging data reviewed in Epic.     Medications:   • Desmopressin Acetate  200 mcg Oral BID   • potassium chloride  40 mEq Oral Once   • Lithium Carbonate  300 mg Per NG Tube Nightly   • piperacillin-tazobactam  3.375 g Intravenous Q8H   • levET be discharge to: TBD  Addendum:  D/w Dr Gary Gottlieb and Dr Kendra Jett. Plans of Trach and PEG has been brought up to the family. May need to consider LP or repeat MRI.      Per RN who just paged me, patient is opening eyes now and moving extremities when his mother is

## 2018-04-29 NOTE — PLAN OF CARE
NEUROLOGICAL - ADULT    • Achieves stable or improved neurological status Not Progressing          Impaired Functional Mobility    • Achieve highest/safest level of mobility/gait Not Progressing          Patient/Family Goals    • Patient/Family Long Term G

## 2018-04-30 NOTE — PROGRESS NOTES
[facility]  Progress Note    Darrius Dietrich Patient Status:  Inpatient    1965 MRN [id number]   [facility] 4SW-A Attending Leighton Martin MD   Psychiatric Day # 10 PCP Chante Rosario MD     Subjective:  Darrius Dietrich is a(n) 47 year SCDs in place   Neurological: Eyes open-a definite change. May be starting to follow simple commands. Withdraws to pain.     Lab Data Review:  Recent Labs   Lab  04/28/18   0430  04/29/18   0434  04/30/18   0441   RBC  2.93*  2.86*  3.16*   HGB  8.5*  8.3 pneumonia with staph aureus-ongoing antibiotic coverage  · Prolonged QTc - likely med/drug toxicity, lyte abnormalities-no clinical issues  · Hyperparathyroidism s/p recent parathyroidectomy-now with a low ionized calcium-receiving calcium supplementation

## 2018-04-30 NOTE — PROGRESS NOTES
[facility]  Report of Psychiatric Progress Note    Екатерина King and Queen Patient Status:  Inpatient    1965 MRN [id number]   [facility] 4SW-A Attending Ashlyn Guidry MD   [facility] Day # 8 PCP Yanni Breaux MD     Date of Admission:  mother, he is less drowsy and less tremulous after getting the IVF's. Interval Hx:  4/21/18- He had a witnessed seizure and was intubated for airway protection. His Na was 173 at the time. Renal thought this was due to diabetes insipidus.  Transferred t Age of Onset   • gastric cancer [OTHER] Father       reports that he has never smoked. He has never used smokeless tobacco. He reports that he does not drink alcohol or use drugs.     Allergies:  No Known Allergies    Medications:    Current Facility-Admini Mouth/Throat, [email or web address]  •  Calcium Carbonate Antacid (TUMS) chewable tab 1,500 mg, 1,500 mg, Per NG Tube, TID CC  •  calcitRIOL (ROCALTROL) solution 0.5 mcg, 0.5 mcg, Oral, Daily  •  glucose (DEX4) oral liquid 15 g, 15 g, Oral, Q15 Min PRN **OR** Gluc

## 2018-04-30 NOTE — CM/SW NOTE
04/30/18 1115   CM/SW Screening   Referral Source    [facility] staff; Chart review;Nursing rounds   Patient's Mental Status (curretnly intubated)   Patient's [address]   Patient lives with (family)   Patient Status P

## 2018-04-30 NOTE — PLAN OF CARE
GASTROINTESTINAL - ADULT    • Maintains or returns to baseline bowel function Progressing          NEUROLOGICAL - ADULT    • Achieves stable or improved neurological status Progressing          RESPIRATORY - ADULT    • Achieves optimal ventilation and oxyg

## 2018-04-30 NOTE — PLAN OF CARE
GASTROINTESTINAL - ADULT    • Maintains or returns to baseline bowel function Progressing        NEUROLOGICAL - ADULT    • Achieves stable or improved neurological status Progressing        Patient/Family Goals    • Patient/Family Long Term Goal Progressin

## 2018-04-30 NOTE — PROGRESS NOTES
[address]  Neurology Progress Note    Gwenevere Majestic Patient Status:  Inpatient    1965 MRN [id number]   [facility] 4SW-A Attending Mayela Moise MD   [facility] Day # 10 PCP Elyssa Camacho MD         Subjective:  Sandoval Chavez 4/25/18 - suggestive of moderate global cerebral dysfunction or moderate encephalopathy.  No seizure activity    • calcitRIOL  1 mcg Oral Daily   • calcium gluconate  3 g Intravenous Once   • Desmopressin Acetate  200 mcg Oral BID   • Lithium Carbonate  300 EEG 4/28: unremarkable     Plan:  Cont keppra 250 mg BID  Seizure precautions  cont to treat underlying metabolic disturbances  Vent management per pulm.    Psych and renal following       Remains intubated but improving   No reported seizure activity, ey

## 2018-04-30 NOTE — PROGRESS NOTES
[facility]  Nephrology Progress Note    Vinita Gallegos Patient Status:  Inpatient    1965 MRN [id number]   [facility] 4SW-A Attending Nicolás Cardozo MD   Hosp Day # 8 PCP Cesar Black MD       SUBJECTIVE:    Events of weekend --    --   111  113*  115*   CO2   --    --   21.0*  21.0*  23.0   BUN   --    --   34*  32*  36*   CREATSERUM   --    --   2.22*  2.27*  2.21*   CA   --    --   6.3*  6.1*  6.7*   MG   --    --   2.7*  2.9*  2.8*   PHOS   --    --   3.5  3.6  3.1   GLU MAGNESIA) 400 MG/5ML suspension 30 mL 30 mL Oral Daily PRN   bisacodyl (DULCOLAX) rectal suppository 10 mg 10 mg Rectal Daily PRN   FLEET ENEMA (FLEET) 7-19 GM/118ML enema 133 mL 1 enema Rectal Once PRN   Chlorhexidine Gluconate (PERIDEX) 0.12 % solution 1 few days. Baseline last fall had been closer to 1.6 mg/dl last fall but has been closer to 2.0 mg/dl this admit    #5. resp insuff- remains intubated.  Discussion ongoing re:trach/PEG    Cherri Arguelles MD  4/30/2018  8:43 AM

## 2018-04-30 NOTE — PAYOR COMM NOTE
--------------  CONTINUED STAY REVIEW    Payor: [id number] #:  [id number]  Authorization Number: [id number]    Admit date: 4/20/18  Admit time: 1    Admitting Physician: Joe Sargent MD  Attending Physician:  Nii Verdin MD due to chronic lithium therapy-better on Indocin with ongoing improvement   · Hypernatremia nephrogenic due to lithium use  · Metabolic acidosis - due to above in addition to saline resuscitation, chronic hyperventilation-remains stable   ·  aspiration pne 26.91 kg/m²   Temp (24hrs), Av.3 °F (36.8 °C), Min:98.2 °F (36.8 °C), Max:98.4 °F (36.9 °C)    WBC 13.6 2018   HGB 8.3 2018   HCT 26.6 2018   .0 2018   CREATSERUM 2.27 2018   BUN 32 2018    2018 today   Elevated LFT's  Respiratory insufficiency   Hypocalcemia      Plan:  Cont keppra 250 mg BID  Seizure precautions  cont to treat underlying metabolic disturbances  Vent management per pulm.            MEDICATIONS ADMINISTERED IN LAST 1 DAY:  calcitRI INPATIENT AUTHORIZATION FOR REMAINING DAYS. THANK YOU.

## 2018-04-30 NOTE — DIETARY NOTE
NUTRITION FOLLOW UP ASSESSMENT    Pt is at high nutrition risk. Pt does not meet malnutrition criteria.     NUTRITION DIAGNOSIS/PROBLEM:    Inadequate oral intake related to inability to consume sufficient energy as evidenced by intubation/Tubefeeding - con for bipolar disorder on lithium, primary hyperparathyroidism/hypercalcemia status post parathyroidectomy 8 days ago presents to the ER with complaints of altered mental status.  Upon admission, his calcium level was noted to be low and his lithium level is

## 2018-04-30 NOTE — PROGRESS NOTES
[facility]  Progress Note     Cary Orr Patient Status:  Inpatient    1965 MRN [id number]   [facility] 4SW-A Attending Stacia Marques MD   Hosp Day # 8 PCP Chas Batista MD     Chief Complaint: intubated    S: Patient Epic.    Medications:   • calcitRIOL  1 mcg Oral Daily   • calcium gluconate  3 g Intravenous Once   • Desmopressin Acetate  200 mcg Oral BID   • Lithium Carbonate  300 mg Per NG Tube Nightly   • piperacillin-tazobactam  3.375 g Intravenous Q8H   • levETIR Mr. Charity Yarbrough is expected to be discharge to: RAJENDRA Ratliff, NP     Addendum:  Pt seen and examined.  I agree with above  Pt awake and following commands slowly     General: NAD, intubated   CVS: RRR  RS: CTAB   Abd: soft NT ND  Ext: no edema     Pl

## 2018-05-01 NOTE — PROCEDURES
[facility], [address] [place] [address]      PATIENT'S NAME: Rafa Atkins   ATTENDING PHYSICIAN: Kelsie Gipson M.D.    PATIENT ACCOUNT #: [de-identified] LOCATION: [address] [place] [address]   MEDICAL RECORD #: [id number] DATE OF BIRTH

## 2018-05-01 NOTE — CM/SW NOTE
Met at ICU bedside and spoke with pt's mother and sister Lemuel. Discussed that currently pulm is hoping pt's mental status improves to support extubation.  Advised that CM is following and that if trach and PEG do become necessary, I am available to help

## 2018-05-01 NOTE — PROGRESS NOTES
[facility]  Nephrology Progress Note    Pavan Luciano Patient Status:  Inpatient    1965 MRN [id number]   [facility] 4SW-A Attending Eliecer Duron MD   [address] Day # 6 PCP Brianna Corona MD       SUBJECTIVE:  Awake on vent, movin 115*   --   105   CO2  21.0*  23.0   --   21.0*   BUN  32*  36*   --   41*   CREATSERUM  2.27*  2.21*   --   2.25*   CA  6.1*  6.7*   --   7.1*   MG  2.9*  2.8*   --    --    PHOS  3.6  3.1   --    --    GLU  129*  135*   --   140*       Recent Labs   Lab rectal suppository 10 mg 10 mg Rectal Daily PRN   FLEET ENEMA (FLEET) 7-19 GM/118ML enema 133 mL 1 enema Rectal Once PRN   Chlorhexidine Gluconate (PERIDEX) 0.12 % solution 15 mL 15 mL Mouth/Throat [email or web address]   Calcium Carbonate Antacid (TUMS) chewable

## 2018-05-01 NOTE — OCCUPATIONAL THERAPY NOTE
Patient failed weaning trial today and back on full support. Patient lethargic and with less command following this pm.  Will hold today and follow up on 5/2/18 for evaluation. Discussed w/ family and RN.

## 2018-05-01 NOTE — PAYOR COMM NOTE
--------------  CONTINUED STAY REVIEW    Payor: [id number] [place] [address] #:  [id number]  Authorization Number: [id number]    Admit date: 4/20/18  Admit time: 1    Admitting Physician: Dale Flores MD  Attending Physician:  Kathleen Juarez MD spontaneously      Continue CPAP trials-to extend     I am reluctant to proceed with tracheostomy and PEG tube in light of his neurological improvement. That being said, the improvement is slow and he may well require those procedures.   We will continue t Intravenous Larry Vega RN    4/30/2018 2014 New Bag 250 mg Intravenous Yared Monteiro RN         Piperacillin Sod-Tazobactam So (ZOSYN) 3.375 g in dextrose 5 % 100 mL ADD-vantage     Date Action Dose Route User    5/1/2018 0500 New Bag 3.375 g Joanna Mustafa

## 2018-05-01 NOTE — CM/SW NOTE
Complex Care Assessment    Pattie Palacio Patient Status:  Inpatient   Age/Gender 47year old male MRN [id number]   [facility] 4SW-A Attending German Solorio MD   [address] Day # 11 PCP Danielle Stein MD     Assessment Type: Initial    Criter

## 2018-05-01 NOTE — PROGRESS NOTES
[facility]  Progress Note    Francisco Lopez Patient Status:  Inpatient    1965 MRN [id number]   [facility] 4SW-A Attending Rohini Marcelo MD   [facility] Day # 11 PCP Elder Garg MD     Subjective:  Francisco Lopez is a(n) 47 year 29.9*  30.1*   MCV  93.0  94.6  92.6   MCH  29.0  29.4  28.3   MCHC  31.2  31.1  30.6*   RDW  15.1  15.3  14.9   NEPRELIM  9.88*  10.40*  10.82*   WBC  13.6*  14.9*  15.6*   PLT  256.0  282.0  276.0     Recent Labs   Lab  04/29/18   0434  04/30/18   5537 recent parathyroidectomy-now with a low ionized calcium-receiving calcium supplementation and repeat ionized calcium pending  · Elevated LFTs mild   · h/o bipolar d/o        Continue CPAP trials-to extend  To complete coverage for pneumonia  Repeat sputum

## 2018-05-01 NOTE — PLAN OF CARE
Assumed care of patient at 0700. AVSS. Orally intubated, mechanically ventilated. Moving all extremities and head spontaneously. +Cough, Gag. More awake today. Opening eyes spontaneously, intermittently following commands. Does not track. Small BMx1.  Fole

## 2018-05-01 NOTE — PROGRESS NOTES
Will  Stop zosyn   And start  naficillin   1 Gm q6 hrs   Due to decrease in sensitively of sputum culture.  Discussed w/ Dr Arnav Lees, NO

## 2018-05-01 NOTE — PROGRESS NOTES
[facility]  Progress Note     [facility] Patient Status:  Inpatient    1965 MRN [id number]   [facility] 4SW-A Attending Ada White MD   [facility] Day # 6 PCP Timo Castillo MD     Chief Complaint: intubated    S: Patient 72 hours. Imaging: Imaging data reviewed in Epic.     Medications:   • calcitRIOL  1 mcg Oral Daily   • Desmopressin Acetate  200 mcg Oral BID   • Lithium Carbonate  300 mg Per NG Tube Nightly   • piperacillin-tazobactam  3.375 g Intravenous Q8H   • TBD  Discharge is dependent on: progress  At this point Mr. Mina Velasquez is expected to be discharge to: TBD    Nae Hsu NP     Addendum:    Pt seen and examined. I agree with above.      General: awake (more today), on SBT, less responsive   CVS: RRR

## 2018-05-01 NOTE — PROGRESS NOTES
[facility] Pharmacy Note: Antimicrobial Weight Dose Adjustment for: piperacillin/tazobactam (Matthew Pratt)    Amaya Mitchell is a 47year old male who has been prescribed piperacillin/tazobactam (ZOSYN) 4.5 g every 8 hours.   CrCl is estimated creatinine clearance is

## 2018-05-01 NOTE — PROGRESS NOTES
[id number] [facility] Neurology Progress Note    Deedee Townsend Patient Status:  Inpatient    1965 MRN [id number]   [facility] 4SW-A Attending Maricruz Chaudhary MD   [facility] Day # 11 PCP Cori Galeazzi, MD     Subjective:  Deedee Townsend i 05/01/2018   CO2 21.0 05/01/2018    05/01/2018   CA 7.1 05/01/2018   ALB 2.5 05/01/2018   ALKPHO 415 05/01/2018   BILT 0.7 05/01/2018   TP 7.7 05/01/2018   AST 43 05/01/2018   ALT 98 05/01/2018   PGLU 111 05/01/2018     Imaging / Diagnostic Testing:

## 2018-05-02 NOTE — PROGRESS NOTES
[facility] Pharmacy Note:  Renal Adjustment for meropenem [facility])    Jacqueline Rodriguez is a 47year old male who has been prescribed meropenem (MERREM) 500 mg every 8 hrs.   CrCl is estimated creatinine clearance is 28.6 mL/min (A) (based on SCr of 2.5 mg/dL (H))

## 2018-05-02 NOTE — PLAN OF CARE
Impaired Functional Mobility    • Achieve highest/safest level of mobility/gait Not Progressing        NEUROLOGICAL - ADULT    • Achieves stable or improved neurological status Not Progressing          GASTROINTESTINAL - ADULT    • Maintains or returns to

## 2018-05-02 NOTE — CONSULTS
[facility] dosing service    Initial Pharmacokinetic Consult for Vancomycin Dosing     Jacqueline Rodriguez is a 47year old male admitted on 4/20/18 who was currently being treated for MSSA pneumonia but has since had persistent fevers for which ID was con atelectasis/infiltrates    Based on the above:    1. This patient will receive a loading dose of Vancomycin  1.75 gm IVPB (25mg/kg, capped at 2g) x 1 dose, followed by 1 gm Q 24 hours  based upon, actual weight, renal function, and pharmacokinetics.     2.

## 2018-05-02 NOTE — PLAN OF CARE
NEUROLOGICAL - ADULT    • Achieves stable or improved neurological status Progressing        RESPIRATORY - ADULT    • Achieves optimal ventilation and oxygenation Progressing        Safety Risk - Non-Violent Restraints    • Patient will remain free from se

## 2018-05-02 NOTE — PROGRESS NOTES
[facility]  Progress Note     Charmaine Sergeant Patient Status:  Inpatient    1965 MRN [id number]   [facility] 4SW-A Attending Lester Solano MD   [address] Day # 15 PCP Milton Bear MD     Chief Complaint: intubated    S: Patient Estimated Creatinine Clearance: 28.6 mL/min (A) (based on SCr of 2.5 mg/dL (H)). No results for input(s): PTP, INR in the last 72 hours. No results for input(s): TROP, CK in the last 72 hours. Imaging: Imaging data reviewed in Epic. hypercalcemia, hyperparathyroidism- replace Ca  13. FEN-  TF on going  14. Superficial thrombophlebitis right mid cephalic vein. No DVTs in UR  15. Anemia  - stable   16.  Elevated LFT's   Tolerating TF ,  Stable        PLAN  Check UA,  Blood cultures 5/1 P

## 2018-05-02 NOTE — PHYSICAL THERAPY NOTE
PHYSICAL THERAPY QUICK EVALUATION - INPATIENT    Room Number: 459/459-A  Evaluation Date: 5/2/2018  Presenting Problem: AMS, seizure, hypernatremia, acute resp failure  Physician Order: PT Eval and Treat    Problem List  Principal Problem:    Acute encep FINDINGS                      AM-PAC '6-Clicks' INPATIENT SHORT FORM - BASIC MOBILITY  How much difficulty does the patient currently have. ..  -   Turning over in bed (including adjusting bedclothes, sheets and blankets)?: Unable   -   Sitting down on and evaluation, patient's clinical presentation is unstable and overall evaluation complexity is considered moderate. Due to Pt's impaired mentation, therapy is limited to PROM only at this time.  Patient currently does not meet criteria for skilled inpatient p

## 2018-05-02 NOTE — CONSULTS
INFECTIOUS DISEASE CONSULT NOTE    Noble Coronado Patient Status:  Inpatient    1965 MRN [id number]   [facility] 4SW-A Attending Colten Cornejo MD   [address] Day # 12 PCP Rosalinda Beckwith % 100 mL IVPB, 2 g, Intravenous, Q6H  •  calcitRIOL (ROCALTROL) solution 1 mcg, 1 mcg, Oral, Daily  •  Desmopressin Acetate (DDAVP) tab 200 mcg, 200 mcg, Oral, BID  •  Lithium Carbonate cap 300 mg, 300 mg, Per NG Tube, Nightly  •  levETIRAcetam (KEPPRA) 25 30 g, Oral, Q15 Min PRN **OR** Glucose-Vitamin C (DEX-4) 4-0.006 g chewable tab 8 tablet, 8 tablet, Oral, Q15 Min PRN  •  Insulin Aspart Pen (NOVOLOG) 100 UNIT/ML flexpen 1-5 Units, 1-5 Units, Subcutaneous, 4 times per day  •  Heparin Sodium (Porcine) 5000 6.7*   --   7.1*   --   7.1*   ALB  2.3*   --   2.5*   --   2.6*   NA  149*   < >  137  136  135*   K  4.4   --   4.3   --   3.8   CL  115*   --   105   --   102   CO2  23.0   --   21.0*   --   21.0*   ALKPHO  455*   --   415*   --   337*   AST  45*   -- (CPT=71045), 5/01/2018, 5:03. INDICATIONS:  ett/ng placement  PATIENT STATED HISTORY: (As transcribed by Technologist)  Check NG tube placement and ET tube position.     FINDINGS:  Endotracheal tube tip lies approximately 3.8 cm above the neha in good po Ofe Joseph MD            Ct Brain Or Head (69936)    Result Date: 4/20/2018  PROCEDURE:  CT BRAIN OR HEAD (26781)  COMPARISON:  [address], MRI BRAIN/IAC  (CPT=70551), 11/21/2017, 9:42.   INDICATIONS:  AMS  TECHNIQUE:  Noncontrast CT scanning is performed thro bile duct. Common bile duct diameter is 4 mm. PANCREAS:  There is obscuration of the pancreatic tail by bowel gas. The pancreatic head and body appear unremarkable. SPLEEN:  Normal. KIDNEYS:  Right kidney measures 10.7 cm. Left kidney measures 10.7 cm. expected major intracranial flow voids are present. CONCLUSION:  No acute intracranial abnormality.     Dictated by: Mary Mcghee MD on 4/26/2018 at 21:45     Approved by: Mary Mcghee MD            Us Venous Doppler Arm Bilat - Diag Img (cpt (txg=60532)    Result Date: 5/1/2018  PROCEDURE:  US VENOUS DOPPLER LEG BILAT - DIAG IMG (CPT=93970)  COMPARISON:  None.   INDICATIONS:  Fever, immobility-rule out DVT  TECHNIQUE:  Real time, grey scale, and duplex ultrasound was used to evaluate the lower of hydronephrosis. Sonographic echotextural changes consistent with renal medical disease.      Dictated by: Jose Juan Schmid MD on 4/21/2018 at 8:15     Approved by: Jose Juan Schmid MD            Xr Chest Ap Portable  (cpt=71045)    Result Date: 5/2/2018  DC the neha.      Dictated by: Wilder Merlin, MD on 5/01/2018 at 6:52     Approved by: Wilder Merlin, MD            Xr Chest Ap Portable  (cpt=71045)    Result Date: 4/30/2018  PROCEDURE:  XR CHEST AP PORTABLE  (CPT=71045)  TECHNIQUE:  AP chest radio CHEST AP PORTABLE  (CPT=71045), 4/27/2018, 4:52. INDICATIONS:  ett tube advanced  PATIENT STATED HISTORY: (As transcribed by Technologist)  Advancement of et tube. FINDINGS:  There is rightward rotation of the patient.   The ET tube is in good position the SVC. Stable elevation of the left hemidiaphragm. Stable bibasilar airspace disease. No pleural effusion or pneumothorax. Leftward curvature of the spine. Degenerative changes in the shoulders. CONCLUSION:  No significant interval change. position. There has been some interval improvement in bibasilar atelectasis. No large pleural effusion is identified. There is some residual airspace disease in the lung bases which may reflect residual atelectasis or less likely pneumonia.   No pneumoth the shoulders. CONCLUSION:  1.  Stable endotracheal and endogastric tube placement. 2.  Left basilar retrocardiac opacity suggestive of pneumonia. Followup chest radiograph after appropriate treatment is recommended to document resolution.      Dictat the neha. 2. Persistent retrocardiac consolidation. 3. Small left pleural effusion.      Dictated by: Tiffany Aschoff, MD on 4/21/2018 at 11:13     Approved by: Tiffany Aschoff, MD            Xr Chest Ap Portable  (cpt=71045)    Result Date: 4/21/2018 CA UTI although UA with minimal pyuria. cxr does not seem to have a catherine new infiltrate and resp status stable.  Continues to grow MSSA from trach, probably colonization at this point since has been on appropriate abx  - drug fevers would be another consi

## 2018-05-02 NOTE — PROGRESS NOTES
[facility]  Report of Psychiatric Progress Note    Rodneysandytong GloverIonia Patient Status:  Inpatient    1965 MRN [id number]   [facility] 4SW-A Attending Leonel Herman MD   [facility] Day # 15 PCP Courtney Rosado MD     Date of Admission:  disorganized and slow to speak. No voices, visions, or suicidal ideation. Per mother, he is less drowsy and less tremulous after getting the IVF's. Interval Hx:  4/21/18- He had a witnessed seizure and was intubated for airway protection.  His Na was 1 exertion   • Stroke [facility]) 06/2017    \"mild\" no residual effects   • Visual impairment     glasses for driving     Past Surgical History:  2013: HX PAROTIDECTOMY Left  Family History   Problem Relation Age of Onset   • gastric cancer [OTHER] Father PRN  •  FLEET ENEMA (FLEET) 7-19 GM/118ML enema 133 mL, 1 enema, Rectal, Once PRN  •  Chlorhexidine Gluconate (PERIDEX) 0.12 % solution 15 mL, 15 mL, Mouth/Throat, [email or web address]  •  Calcium Carbonate Antacid (TUMS) chewable tab 1,500 mg, 1,500 mg, Per NG T mild diffuse slowing background activity in bilateral hemispheres for the patient's age but no specific etiology. Karen Hush is no active epileptiform activity identified.  This pattern of mild abnormality could be seen in a variety of metabolic abnormality, i

## 2018-05-02 NOTE — PAYOR COMM NOTE
--------------  CONTINUED STAY REVIEW    Payor: [id number] [place] [address] #:  [id number]  Authorization Number: [id number]    Admit date: 4/20/18  Admit time: 1    Admitting Physician: Ayanna Alfonso MD  Attending Physician:  Chintan Nichole CONCLUSION:  Mild left basilar atelectasis. Lines and catheters noted as described above in good position. ASSESSMENT:     1.  Fevers/ sepsis  - no obvious source on exam. Need to r/o line infection since pt with PICC line, as well as CA UTI although Discussed tracheotomy with family-risks benefits and alternatives,  [facility] consents to proceed.         MEDICATIONS ADMINISTERED IN LAST 1 DAY:     calcitRIOL (ROCALTROL) solution 1 mcg     Date Action Dose Route User    5/2/2018 0856 Given 1 mcg Oral Sandra PATIENT IS APPROVED TO 5/1 - REMAINS INTUBATED IN ICU - PLANS FOR TRACH - PLEASE PROVIDE ADDITIONAL INPATIENT DAYS. THANK YOU.

## 2018-05-02 NOTE — ANESTHESIA PREPROCEDURE EVALUATION
PRE-OP EVALUATION    Patient Name: Екатерина Haynes    Pre-op Diagnosis: RESPIRATORY FAILURE    Procedure(s):  TRACHEOTOMY    Surgeon(s) and Role:     Janette Lawton MD - Primary    Pre-op vitals reviewed.   Temp: 99.1 °F (37.3 °C)  Pulse: 75  Resp: 31  B magnesium hydroxide (MILK OF MAGNESIA) 400 MG/5ML suspension 30 mL 30 mL Oral Daily PRN   bisacodyl (DULCOLAX) rectal suppository 10 mg 10 mg Rectal Daily PRN   FLEET ENEMA (FLEET) 7-19 GM/118ML enema 133 mL 1 enema Rectal Once PRN   Chlorhexidine Gluconat Admitted 4/20 to ED with AMS and elevated lithium levels.  Developed hypernatremia, LILLIE, aspiration pneumonia and respiratory failure      Past Surgical History:  2013: HX PAROTIDECTOMY Left     Smoking status: Never Smoker    Smokeless tobacco: Never Used

## 2018-05-02 NOTE — PROGRESS NOTES
[id number] [facility] Neu[facility] Progress Note    Grant Lind Patient Status:  Inpatient    1965 MRN [id number]   [facility] 4SW-A Attending Alix Faulkner MD   [facility] Day # 12 PCP Margareth Richards MD         Subjective:  Kt Brown today  Blood cx neg from earlier this admission, new cx sent per ID  UA neg for infection today  Li level 0.9 (WNL) today    Assessment:   Encephalopathy likely metabolic and multifactorial (hypernatremia, seizure, lithium toxicity, hypocalcemia) Caretha Brayan does appear slightly better  Continue infectious work up for new fever  For now would continue Keppra 250mg BID, initial seizure due to hypernatremia however, and the Keppra can at a later time point be weaned (weeks)    Sachin Dominguez DO  Neurology and Debara Bosworth

## 2018-05-02 NOTE — PROGRESS NOTES
[facility]  Progress Note    Honey Pain Patient Status:  Inpatient    1965 MRN [id number]   [facility] 4SW-A Attending Hanna Oropeza MD   [facility] Day # 12 PCP Molly Cheadle, MD     Subjective:  Honey Pain is a(n) 47 yea 04/30/18   0441  05/01/18   0502  05/02/18   0410   RBC  3.16*  3.25*  3.36*   HGB  9.3*  9.2*  9.6*   HCT  29.9*  30.1*  30.8*   MCV  94.6  92.6  91.7   MCH  29.4  28.3  28.6   MCHC  31.1  30.6*  31.2   RDW  15.3  14.9  14.7   NEPRELIM  10.40*  10.82*  17 due to above in addition to saline resuscitation, chronic hyperventilation-remains stable   ·  aspiration pneumonia with staph aureus-ongoing antibiotic coverage  · Prolonged QTc - likely med/drug toxicity, lyte abnormalities-no clinical issues  · Hyperpar

## 2018-05-02 NOTE — CONSULTS
[facility]    Report of Consultation    [facility] Patient Status:  Inpatient    1965 MRN [id number]   [facility] 4SW-A Attending Gianfranco Queen MD   [facility] Day # 15 PCP Stacie Markham MD     Date of Admission:  2018  D injection 50 mcg, 50 mcg, Intravenous, Q30 Min PRN  •  acetaminophen (TYLENOL) tab 650 mg, 650 mg, Oral, Q6H PRN **OR** acetaminophen (TYLENOL) 160 MG/5ML oral liquid 650 mg, 650 mg, Oral, Q6H PRN **OR** acetaminophen (TYLENOL) 650 MG rectal suppository 65 fundi     benign, both eyes   Ears:    Normal TM's and external ear canals, both ears   Nose:   Nares normal, septum midline, mucosa normal, no drainage    or sinus tenderness   Throat:   Lips, mucosa, and tongue normal; teeth and gums normal   Neck:   Sup respirations unlabored   Chest wall:    No tenderness or deformity   Heart:    Regular rate and rhythm, S1 and S2 normal, no murmur, rub   or gallop   Abdomen:     Soft, non-tender, bowel sounds active all four quadrants,     no masses, no organomegaly   [facility]. Thank you for allowing me to participate in the care of your patient.     Yanira Salinas  5/2/2018  12:15 PM

## 2018-05-02 NOTE — PROGRESS NOTES
[facility]  Nephrology Progress Note    Siri Bautista Patient Status:  Inpatient    1965 MRN [id number]   [facility] 4SW-A Attending Chaitanya Byrnes MD   [facility] Day # 15 PCP Ashutosh Gee MD       SUBJECTIVE:  Febrile overnight, a 0410   NA  149*  145*  137  136  135*   K  4.4   --   4.3   --   3.8   CL  115*   --   105   --   102   CO2  23.0   --   21.0*   --   21.0*   BUN  36*   --   41*   --   48*   CREATSERUM  2.21*   --   2.25*   --   2.50*   CA  6.7*   --   7.1*   --   7.1* hydroxide (MILK OF MAGNESIA) 400 MG/5ML suspension 30 mL 30 mL Oral Daily PRN   bisacodyl (DULCOLAX) rectal suppository 10 mg 10 mg Rectal Daily PRN   FLEET ENEMA (FLEET) 7-19 GM/118ML enema 133 mL 1 enema Rectal Once PRN   Chlorhexidine Gluconate (PERIDEX

## 2018-05-03 NOTE — ANESTHESIA POSTPROCEDURE EVALUATION
[address] [facility] Patient Status:  Inpatient   Age/Gender 47year old male MRN [id number]   Location [address] [facility] Attending Gianfranco Queen MD   [address] Day # 15 PCP Stacie Markham MD       Anesthesia Post-op Note    Procedur

## 2018-05-03 NOTE — OCCUPATIONAL THERAPY NOTE
OT orders received, chart reviewed. Pt s/p trach 5/3/18 d/t failed vent weaning. Patient is not following motor commands. Currently not appropriate for skilled OT intervention.   Pt has been placed on our mobility team for PROM and PRAFO boots for positio

## 2018-05-03 NOTE — BRIEF OP NOTE
Pre-Operative Diagnosis: RESPIRATORY FAILURE     Post-Operative Diagnosis: RESPIRATORY FAILURE      Procedure Performed:   Procedure(s):  TRACHEOTOMY    Surgeon(s) and Role:     Claudia Alfonso MD - Primary    Assistant(s):        Surgical Findings: copiou

## 2018-05-03 NOTE — DIETARY NOTE
Nutrition Short Note:    Hospital has run out of Vital 1.2. Product is ordered and will be in tomorrow. Over night please run Vital High protein at 60ml/hr as a substitute until Vital 1.2 can be restarted tomorrow.   Water flushes to remain at 400ml q 6 h

## 2018-05-03 NOTE — PLAN OF CARE
Upon assessment pt intubated with TF infusing. Pt fails to follow commands, but does intermittently open eyes in response to voice. Pt is restless and requires frequent repositioning.  Pt intermittently responds to painful stimuli well as non purposefully m

## 2018-05-03 NOTE — PROGRESS NOTES
[facility]  Nephrology Progress Note    Jessica Landry Patient Status:  Inpatient    1965 MRN [id number]   [facility] 4SW-A Attending Edita Mann MD   [facility] Day # 15 PCP Marybelle Mortimer, MD       SUBJECTIVE:  Still not following 05/01/18   1549  05/02/18   0410  05/02/18   1344  05/03/18   0432   NA  137  136  135*  137  143   K  4.3   --   3.8   --   3.5*   CL  105   --   102   --   110   CO2  21.0*   --   21.0*   --   21.0*   BUN  41*   --   48*   --   52*   CREATSERUM  2.25* 650 MG rectal suppository 650 mg 650 mg Rectal Q6H PRN   docusate sodium (COLACE) liquid 100 mg 100 mg Oral BID   PEG 3350 (MIRALAX) powder packet 17 g 17 g Oral Daily PRN   magnesium hydroxide (MILK OF MAGNESIA) 400 MG/5ML suspension 30 mL 30 mL Oral Nasir

## 2018-05-03 NOTE — PAYOR COMM NOTE
--------------  CONTINUED STAY REVIEW    Payor: [id number] #:  [id number]  Authorization Number: [id number]    Admit date: 4/20/18  Admit time: 1    Admitting Physician: Edita Mann MD  Attending Physician:  Anay Huntley restlessness  No evidence of distress    Cultures: Sputum culture has returned with ongoing staph aureus as well as now group B strep  Blood cultures negative ×2 days     Radiology: Chest x-ray with ongoing by basilar right greater than left atelectasis ve 0.12 % solution 15 mL     Date Action Dose Route User    5/3/2018 0837 Given 15 mL Mouth/Throat Raman Perrin RN    5/2/2018 2030 Given 15 mL Mouth/Throat Vern Stephens RN      Desmopressin Acetate (DDAVP) tab 200 mcg     Date Action Dose Route User

## 2018-05-03 NOTE — PROGRESS NOTES
[address] [facility]  TEL: [phone]  FAX: [phone]    Marisol Jorgensen Patient Status:  Inpatient    1965 MRN [id number]   [facility] 4SW-A Attending Jaelyn Plaza MD   [facility] Day # 15 PCP Bean Alvarez, 21.0*   --   21.0*   --   21.0*   ALKPHO  415*   --   337*   --   256*   AST  43*   --   41   --   35   ALT  98*   --   98*   --   89*   BILT  0.7   --   1.7   --   0.8   TP  7.7   --   7.8   --   7.4    < > = values in this interval not displayed.        Deena Simon Normal appearance of the intrahepatic ducts, and common bile duct. Common bile duct diameter is 4 mm. PANCREAS:  There is obscuration of the pancreatic tail by bowel gas. The pancreatic head and body appear unremarkable.  SPLEEN:  Normal. KIDNEYS:  Right infarct. No findings to indicate focal brain lesion, or diffuse brain abnormality on these noncontrast brain images.     Dictated by: Lon Garcia MD on 5/02/2018 at 18:51     Approved by: Lon Garcia MD            PROCEDURE: XR CHEST AP PORTABLE (C better     PLAN:     -cont vanco and annette for now  -follow Bcx and Ucx  -follow LFTs, recheck in am. Send lipase as well  -follow temps  -check CT of A/P if fevers persist w/o an obvious source  Case and plan d/w family.  D/w dr Kelsie Shepherd

## 2018-05-03 NOTE — DIETARY NOTE
NUTRITION FOLLOW UP ASSESSMENT    Pt is at high nutrition risk. Pt does not meet malnutrition criteria.     NUTRITION DIAGNOSIS/PROBLEM:    Inadequate oral intake related to inability to consume sufficient energy as evidenced by intubation/Tubefeeding - con without nutrition since 4/21. RN states possible extubation today. If pt can not be extubated would recommend starting TPN until pt can tolerate TF advancement.     47year old male with past medical significant for bipolar disorder on lithium, primary hy

## 2018-05-03 NOTE — PROGRESS NOTES
[facility]  Progress Note    Paulette Corporal Patient Status:  Inpatient    1965 MRN [id number]   [facility] 4SW-A Attending Mildred Mcfadden MD   [address] Day # 15 PCP Timo Castillo MD     Subjective:  Paulette Mayers is a(n) 47 yea ×2 days    Radiology: Chest x-ray with ongoing by basilar right greater than left atelectasis versus infiltrate no increasing changes        Medications reviewed     Assessment and Plan:   Patient Active Problem List:     Hypercalcemia     Bipolar 1 disord coverage  · Prolonged QTc - likely med/drug toxicity, lyte abnormalities-no clinical issues  · Hyperparathyroidism s/p recent parathyroidectomy-now with a low ionized calcium-receiving calcium supplementation and repeat ionized calcium pending  · Elevated

## 2018-05-03 NOTE — PLAN OF CARE
Pt with new \"jerky\" all extrem. Movements. ROSELIA Jimenez at bedside. One time dose of ativan ordered and Neuro paged. 1700 Dr. Soraida Manzanares at bedside. Received orders for labs.  Will continue to watch for the night and will inform nightshift RN that if

## 2018-05-03 NOTE — PROGRESS NOTES
[facility]  Progress Note     Francisco Pamelapiper Patient Status:  Inpatient    1965 MRN [id number]   [facility] 4SW-A Attending Marleni Pereira MD   [address] Day # 15 PCP Priscilla Linton MD     Chief Complaint: intubated    S: Patient Estimated Creatinine Clearance: 35.2 mL/min (A) (based on SCr of 2.03 mg/dL (H)). Recent Labs   Lab  05/03/18   0432   PTP  15.5*   INR  1.18*       No results for input(s): TROP, CK in the last 72 hours.          Imaging: Imaging data reviewed in 10. Lithium toxicity- resolved   1.    psych following      11. Bipolar d/o, per psych   12. Recent parathyroidectomy for hypercalcemia, hyperparathyroidism- replace Ca  13. FEN-  TF on  Hold for trach today, replace w/ Sidneyff post trach   14.  Superfic

## 2018-05-04 PROBLEM — Z71.89 GOALS OF CARE, COUNSELING/DISCUSSION: Status: ACTIVE | Noted: 2018-01-01

## 2018-05-04 PROBLEM — Z51.5 PALLIATIVE CARE ENCOUNTER: Status: ACTIVE | Noted: 2018-01-01

## 2018-05-04 NOTE — PROGRESS NOTES
Notes reviewed, d/w family at bedside. In light of ongoing poor neurologic status his family has decided to pursue hospice care which seems appropriate. Will d/c followup.     Selene Henderson MD  5/4/2018  11:22 AM

## 2018-05-04 NOTE — PROGRESS NOTES
[facility]  Progress Note    Deedee Townsend Patient Status:  Inpatient    1965 MRN [id number]   [facility] 4SW-A Attending Nicolette Danielle MD   [facility] Day # 14 PCP Cori Galeazzi, MD     Subjective:  Deedee Townsend is a(n 47 yea 95.0   MCH  28.6  28.3  28.3   MCHC  31.2  30.3*  29.8*   RDW  14.7  14.9  15.1   NEPRELIM  17.66*  14.63*  14.54*   WBC  22.1*  18.0*  17.9*   PLT  278.0  235.0  282.0     Recent Labs   Lab  05/03/18   0432  05/03/18   1708  05/04/18   0304   GLU  122*  1 issues  · Hyperparathyroidism s/p recent parathyroidectomy-now with a low ionized calcium-receiving calcium supplementation and repeat ionized calcium pending  · Elevated LFTs mild -continues to trend down  · h/o bipolar disorder     Stable post tracheosto

## 2018-05-04 NOTE — PROGRESS NOTES
Discussed with primary RN. Family has made decision to transition patient into comfort care. Dr. Terri Salcedo made aware. Neurology will sign off at this time.       ROSELIA Rodriguez  [place] [id number]  Pager [phone]  5/4/2018, 1

## 2018-05-04 NOTE — PALLIATIVE CARE NOTE
PC Consult received. PCSW reviewed initial assessment completed by Unit JORGE/Tova. PC APN to follow-up for goals of care. PCSW will follow-up based on the outcome of the goals of care discussion.     10:52am: GIULIANO VELOZ/Cecille advised PCSW that per goals of car

## 2018-05-04 NOTE — HOSPICE RN NOTE
Hospice MSW met with family and obtained consents for hospice. Family ready to take patient off of the vent. Spoke to Dr. Joselyn Goodell. And obtained weaning orders. Spoke to Dr. Grey Wade and obtained orders to d/c non comfort medications.  Keppra left in p

## 2018-05-04 NOTE — CONSULTS
[address] [facility]  [id number]  Hospital Day #14  Date of Consult: 05/04/18       Reason for Consultation: Consult requested for evaluation of palliative care needs and goals of care discussion. be consulted for inpatient hospice evaluation. Will transition to comfort measures with pulmonologist when family is ready to proceed. Bedside RN and ICU NP aware of plan.     Advance Directive: None- information given     Healthcare Agent Appointed:

## 2018-05-04 NOTE — CM/SW NOTE
Hospice sw met with family to discuss hospice services and goals of care. Family ready to proceed with hospice services where he will be weaned off of his trach vent. Consents signed.

## 2018-05-04 NOTE — PLAN OF CARE
Approached by pt's sister, Lemuel today regarding goals of care. She does not see progress in her brother and is concerned that he will not get better.  She says she is speaking on behalf of her and her mother and that they've had this conversation (pt's m

## 2018-05-04 NOTE — PAYOR COMM NOTE
--------------  CONTINUED STAY REVIEW    Payor: [address] #:  [id number]  Authorization Number: [id number]    Admit date: 4/20/18  Admit time: 1    Admitting Physician: Millie Nelson MD  Attending Physician:  Casimiro Enamorado mental status. Hospital course complicated by no improvement in neurologic status, continued need for respiratory support, trach placement on 5/3/18.     Assessment/Recommendations:  Prognosis/Goals of Care: Met with patient's sister and mother.   Introduc 100 mL MBP     Date Action Dose Route User    5/4/2018 0021 New Bag 500 mg Intravenous Jessica Rapp RN    5/3/2018 1232 New Bag 500 mg Intravenous Raj Ervin RN         Vancomycin HCl (VANCOCIN) 1,000 mg in sodium chloride 0.9 % 250 mL IVPB add-v

## 2018-05-04 NOTE — PROCEDURES
[facility] - ELECTROENCEPHALOGRAM (EEG) REPORT  Patient Name:  Akhil Gallagher   MRN / CSN:  [id number] / [id number]   Date of Birth / Age:  7/5/1965 /  47year old   Encounter Date:  5/4/18         METHODS:  Twenty-two electrodes were applied according to the 10 performed. IMPRESSION:  This EEG is an abnormal study recorded in the lethargic states showing background slowing into the theta range (6-7Hz) consistent with a moderate encephalopthy.  Significant movement and electrode artifact obscured portions of the

## 2018-05-04 NOTE — OPERATIVE REPORT
[facility]    PATIENT'S NAME: Junior Brown   ATTENDING PHYSICIAN: Eddi Allred M.D. OPERATING PHYSICIAN: Elena Cordero M.D.    PATIENT ACCOUNT#:   [de-identified]    LOCATION:  [address] [place] [address]  MEDICAL RECORD #:   [id number]       DATE OF BIRTH:  [birth year]

## 2018-05-04 NOTE — PLAN OF CARE
Upon assessment pt alert, eyes opening in response to voice, failing to follow commands, restless. Day shift rn reported of jerky movements, neuro aware, will continue to monitor. Dubhoff in place, tf infusing. Curtis in place, draining.  Pt trached and on v

## 2018-05-05 NOTE — PLAN OF CARE
Assumed care of patient at 0700. Resting comfortably in bed. Morphine gtt infusing. Repositioned for comfort. Family at bedside. Emotional support provided. Spiritual care paged for additional family support. [facility] notified of impending death.  Referral # 18

## 2018-05-05 NOTE — PROGRESS NOTES
[facility]  Progress Note     Davidson Crespo Patient Status:  Inpatient    1965 MRN [id number]   [facility] 4SW-A Attending Moshe Martin MD   Hosp Day # 1 PCP Rachell Souza MD     Chief Complaint: hospice care    S: Patien Neha Ruth MD

## 2018-05-05 NOTE — DISCHARGE SUMMARY
[facility] [place] [facility]ITAL[facility]  DISCHARGE SUMMARY     Usman Cruz Patient Status:  Inpatient    1965 MRN [id number]   [facility] 4SW-A Attending No att. providers found   Hosp Day # 1 PCP Antoni Roberson MD     Date of Admission: 2018  Yo right arm  16. Anemia  17. Elevated LFTs  18.  Lithium-induced nephrogenic DI      History of Present Illness:   Cyndi Quinn is a 47year old male with past medical significant for bipolar disorder on lithium, primary hyperparathyroidism/hypercalcemia feedings have been tolerating. Neurology is been following the patient closely along with psychiatry. Patient has had multiple EEGs and imaging done. Abnormal EEGs. Imaging without any evidence of acute stroke.   Patient felt to have acute delirium pe auscultation bilaterally. RR elevated this am   Cardiovascular: S1, S2. Regular rate and rhythm. No murmurs, rubs or gallops. NSR   Abdomen: Soft, nontender, nondistended. Positive bowel sounds.   TF   Neuro:   Not opening eyes no response to commands

## 2018-05-05 NOTE — PLAN OF CARE
Upon assessment pt appears comfortable, resting in bed with morphine drip infusing, pts family at bedside. Curtis in place for comfort. Pt slowly weaned on trach collar; discussed process with family.  Oxygen removed at 1220, pt looks comfortable, family at

## 2018-05-05 NOTE — H&P
[facility]  History and Physical     [facility]:  [facility]    1965 MRN [id number]   [facility] 4SW-A Attending Mildred Mcfadden MD   Hosp Day # 0 PCP Timo Castillo MD     Chief Complaint: Altered mental s  Myoclonic jerks     Extremities: No edema.       Diagnostic Data:      Labs:  Recent Labs   Lab  05/02/18   0410  05/03/18   0432  05/04/18   0304   WBC  22.1*  18.0*  17.9*   HGB  9.6*  9.1*  9.1*   MCV  91.7  93.5  95.0   PLT  278.0  235.0  282.0   BAND nondistended        Assessment/Plan:  I have had a face-to-face encounter with this patient and agree with estelita guzman's assessment and plan for this patient.     Terence Comment, MD  [facility]  Internal Medicine Hospitalist  Pager [phone]  Martina

## 2018-05-05 NOTE — SPIRITUAL CARE NOTE
paged by nurse as pt. Entered hospice yesterday.    initiated relationship with large family, learning that they had received visit yesterday by their [facility] [employer] .  Shadi Morales provided support as family shared patient illness beginn

## 2018-05-05 NOTE — HOSPICE RN NOTE
10:45 am-Inpatient Hospice day 2- Patient unresponsive to verbal/tactile stimuli. Restless intermittently- IV ativan given 4x so far since hospice admission yesterday. Will monitor to ensure that q4h prn is effective. If not will call for new orders.  Morph

## 2018-05-05 NOTE — DISCHARGE SUMMARY
[facility]  DISCHARGE SUMMARY     Darrius Dietrich Patient Status:  Inpatient    1965 MRN [id number]   [facility] 4SW-A Attending No att. providers found   Hosp Day # 15 PCP Chante Rosario MD     Date of Admission: 2018  Da right arm  16. Anemia  17. 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 Neurology is been following the patient closely along with psychiatry. Patient has had multiple EEGs and imaging done. Abnormal EEGs. Imaging without any evidence of acute stroke.   Patient felt to have acute delirium pe [facility] [place] [address] [place] [id number] [phone]            Vital signs:  Temp:  [98.3 °F (36.8 °C)-99.4 °F (37.4 °C)] 99.3 °F (37.4 °C)  Pulse:  [] 78  Resp:  [16-49] 29  BP: ()/(57-71) 144/62  FiO2 (%):  [30 %-50 %] 50 %    Physical Exam:

## 2018-05-05 NOTE — PROGRESS NOTES
[facility]  Progress Note     Marleni Marina Patient Status:  Inpatient    1965 MRN [id number]   [facility] 4SW-A Attending Margareth Salazar MD   Hosp Day # 1 PCP Elizabeth Pate MD     Chief Complaint: intubated    S: Patient 250 mg Intravenous Q12H   • scopolamine  1 patch Transdermal Q72H       ASSESSMENT / PLAN:     1. Acute respiratory failure - multifactorial> AMS/seizure/ PNA    WBC had increased, w/ fevers, decrease in mental status  - no new source of infection found.  A    Subjective:  Not following commands, though is awake.       ROS:   Cannot assess     Objective:  Constitutional: no signs of distress; awake; not following commands  Cardiovascular: regular rate and rhythm, no murmurs, rubs, or gallops  Respiratory:

## 2018-05-06 NOTE — PLAN OF CARE
Safety Risk - Non-Violent Restraints    • Patient will remain free from self-harm Completed          DEATH & DYING    • Pt/Family communicate acceptance of impending death and feel psychological comfort and peace Progressing        PAIN - ADULT    • Verbal

## 2018-05-06 NOTE — PLAN OF CARE
DEATH & DYING    • Pt/Family communicate acceptance of impending death and feel psychological comfort and peace Progressing        PAIN - ADULT    • Verbalizes/displays adequate comfort level or patient's stated pain goal Progressing        Received patien

## 2018-05-06 NOTE — HOSPICE RN NOTE
GIP day 3- Patient unresponsive, appears calm/comfortable. Morphine drip infusing at 4mg/hr. Lorazepam given prn and this has been effectively managing restlessness at this point. Coordinated care with DANIELLA Andrade.  Will continue daily and prn hospice DANIELLA jorge

## 2018-05-06 NOTE — PROGRESS NOTES
[facility]  Progress Note     Cyndi Fareedks Patient Status:  Inpatient    1965 MRN [id number]   [facility] 4SW-A Attending Tricia Barnes MD   Hosp Day # 2 PCP Manoj Rawls MD     Chief Complaint: hospice care    S: Tay

## 2018-05-07 NOTE — HOSPICE RN NOTE
GIP day 4 pt unresponsive having periods of apnea with shallow res 8. Morphine gtt infusing at 4 mg. Family at bedside. Temp 99.1 ax . Continue gip for resp distress.

## 2018-05-07 NOTE — PLAN OF CARE
Rec'd patient from ICU this am to rm 512. Pt unresponsive, appears comfortable, on morphine gtt. Scopolamine patch applied behind left ear, old patch removed. Mouth suctioned prn, trach to job Curtis draining small amount clear, maico urine.  Supportive fami

## 2018-05-07 NOTE — PLAN OF CARE
DEATH & DYING    • Pt/Family communicate acceptance of impending death and feel psychological comfort and peace Progressing        PAIN - ADULT    • Verbalizes/displays adequate comfort level or patient's stated pain goal Progressing        Patient under h

## 2018-05-07 NOTE — PROGRESS NOTES
[facility]  Progress Note     Kaz Thompson Patient Status:  Inpatient    1965 MRN [id number]   [facility] 4SW-A Attending Kristie Mckeon MD   Hosp Day # 3 PCP Lauren Yepez MD     Chief Complaint: hospice care    S: Patien

## 2018-05-07 NOTE — PAYOR COMM NOTE
--------------  DISCHARGE REVIEW    Payor: [address] [place] [place] [address] #:  [id number]  Authorization Number: [id number]    Admit date: 4/20/18  Admit time:  4993  Discharge Date: 5/4/2018  3:04 PM     Admitting Physician: Neisha Elena MD  Primary

## 2018-05-07 NOTE — SPIRITUAL CARE NOTE
Supportive visit made to family, family appears to be coping ok, Pt continues to be in a peaceful transition. [facility]  Spiritual Care Counselor  [facility]  [email or web address]  [phone]

## 2018-05-08 NOTE — DISCHARGE SUMMARY
[facility]  DISCHARGE SUMMARY     Noble Coronado Patient Status:  Inpatient    1965 MRN [id number]   [facility] 5NW-A Attending Colten Cornejo MD   [facility] Day # 4 PCP Sammy Ambrose MD     Date of Admission: 2018  Date of D been prescribed any medications.          [facility] reviewed: no    Follow-up appointment:   No follow-up provider specified.        -----------------------------------------------------------------------------------------------  PATIENT DISCHARGE INSTRUCTIONS:

## 2018-05-08 NOTE — PLAN OF CARE
DEATH & DYING    • Pt/Family communicate acceptance of impending death and feel psychological comfort and peace Progressing        PAIN - ADULT    • Verbalizes/displays adequate comfort level or patient's stated pain goal Progressing          PROBLEM: [facility]

## 2018-05-08 NOTE — CM/SW NOTE
SW checked in with family after pt . Family coping appropriately and are waiting for more family and  to come. Mother tearful. Offered support and talked about bereavement support for anyone who may need it.  Provided call back number for any a

## 2018-05-08 NOTE — PROGRESS NOTES
05/08/18 0855   Clinical Encounter Type   Visited With Family   Crisis Visit Death   Patient's Supportive Strategies/Resources Family finds community/spiritual support within the [employer] . [facility]  is in route to the hospital.    Patient S

## 2018-05-08 NOTE — HOSPICE RN NOTE
GIP hospice day 2- patient unresponsive, appears comfortable with current interventions. Daughter at bedside. Patients boyfriend having a difficult time accepting that patient is not being fed/given o2.  Hospice MSW to meet with him later today to talk abou

## 2018-05-09 ENCOUNTER — TELEPHONE (OUTPATIENT)
Dept: INTERNAL MEDICINE CLINIC | Facility: CLINIC | Age: 53
End: 2018-05-09

## 2018-05-09 NOTE — TELEPHONE ENCOUNTER
[facility] called to notify us that patient passed away on 18 @ 8:25am; no  home is assigned yet.

## 2018-05-16 NOTE — PHYSICAL THERAPY NOTE
Chart reviewed. Noting start of propofol drip this am, due to pt agitation, therefore pt not appropriate for inpt PT assessment today. Will continue to follow as appropriate. 5

## 2018-11-29 NOTE — PLAN OF CARE
Problem: RESPIRATORY - ADULT  Goal: Achieves optimal ventilation and oxygenation  INTERVENTIONS:  - Assess for changes in respiratory status  - Assess for changes in mentation and behavior  - Position to facilitate oxygenation and minimize respiratory effo none

## 2019-08-20 NOTE — PLAN OF CARE
DISCHARGE PLANNING    • Discharge to home or other facility with appropriate resources Not Progressing        GASTROINTESTINAL - ADULT    • Maintains or returns to baseline bowel function Not Progressing        Impaired Functional Mobility    • Achieve hig Self

## 2021-01-01 NOTE — PLAN OF CARE
Pt back from OR at 1327. Received 100 mcg of fentanyl and 1 mg of versed. Pt resting comfortably. Temp: 98.8. RT at bedside addressing trach care. Pt with #8 cuffed shiley. Will continue to monitor pt. Aby Lilly  (RN)  2021 05:37:04

## 2022-09-06 NOTE — PROGRESS NOTES
[facility] Pulmonary/Critical Care Progress Note     SUBJECTIVE/24H Events: All events, procedures, notes reviewed. No acute events overnight pernursing.  Remains intubated/sedated      Review of Systems:   A comprehensi 3.8  3.8  4.0   CL  136*  132*  132*  131*   CO2  18.0*  20.0*  20.0*  16.0*     Recent Labs   Lab  04/20/18   0833  04/22/18   0614   RBC  3.66*  3.87*   HGB  10.7*  11.3*   HCT  33.0*  36.5*   MCV  90.2  94.3   MCH  29.2  29.2   MCHC  32.4  31.0   RDW Chest Ap Portable  (cpt=71045)    Result Date: 4/22/2018  CONCLUSION:  Endotracheal tube tip projects 3.3 cm above the neha. Interval placement of an enteric tube with the distal end overlying the stomach.      Dictated by: Nuria Jon MD on 4/22/2018 at hypernatremia; CT head negative  · Respiratory failure due to above  · Acute on chronic renal failure, baseline creatinine 1.6; acutely worsened likely due to hypovolemia and med/drug toxicity; improved  · Metabolic acidosis - due to above; improved  · Fev Tranexamic Acid Counseling:  Patient advised of the small risk of bleeding problems with tranexamic acid. They were also instructed to call if they developed any nausea, vomiting or diarrhea. All of the patient's questions and concerns were addressed.

## 2025-02-11 NOTE — TELEPHONE ENCOUNTER
----- Message from Rachell Souza MD sent at 12/12/2017  3:40 PM CST -----  Pt with elevated PTH and ionized calcium points to primary hyper[parathyroidism.  Refer to Dr. Cr Lynch (ENT) for eval Adequate: hears normal conversation without difficulty

## (undated) DEVICE — KENDALL SCD EXPRESS SLEEVES, KNEE LENGTH, MEDIUM: Brand: KENDALL SCD

## (undated) DEVICE — PREMIUM WET SKIN PREP TRAY: Brand: MEDLINE INDUSTRIES, INC.

## (undated) DEVICE — SURGIFLO

## (undated) DEVICE — SPONGE: SPECIALTY PEANUT XR 100/CS: Brand: MEDICAL ACTION INDUSTRIES

## (undated) DEVICE — SUTURE SILK 3-0

## (undated) DEVICE — GLOVE BIOGEL M SURG SZ 71/2

## (undated) DEVICE — ARISTA 1 GRAM

## (undated) DEVICE — SPECIMEN CONTAINER,POSITIVE SEAL INDICATOR, OR PACKAGED: Brand: PRECISION

## (undated) DEVICE — SUTURE SILK 2-0

## (undated) DEVICE — GOWN,SIRUS,FABRIC-REINFORCED,X-LARGE: Brand: MEDLINE

## (undated) DEVICE — SOL  .9 1000ML BTL

## (undated) DEVICE — 3M™ STERI-STRIP™ REINFORCED ADHESIVE SKIN CLOSURES, R1547, 1/2 IN X 4 IN (12 MM X 100 MM), 6 STRIPS/ENVELOPE: Brand: 3M™ STERI-STRIP™

## (undated) DEVICE — CAUTERY BLADE 2IN INS E1455

## (undated) DEVICE — DRAIN SPONGES,6 PLY: Brand: EXCILON

## (undated) DEVICE — UNDYED BRAIDED (POLYGLACTIN 910), SYNTHETIC ABSORBABLE SUTURE: Brand: COATED VICRYL

## (undated) DEVICE — STERILE POLYISOPRENE POWDER-FREE SURGICAL GLOVES: Brand: PROTEXIS

## (undated) DEVICE — GAUZE SPONGES,USP TYPE VII GAUZE, 12 PLY: Brand: CURITY

## (undated) DEVICE — VACUTAINER 3ML LAVENDAR HEMAGU

## (undated) DEVICE — HEAD AND NECK CDS-LF: Brand: MEDLINE INDUSTRIES, INC.

## (undated) DEVICE — SUTURE VICRYL 3-0 SH

## (undated) DEVICE — CAUTERY NEEDLE 2IN INS E1465

## (undated) DEVICE — HARMONIC FOCUS SHEARS 9CM LENGTH + ADAPTIVE TISSUE TECHNOLOGY FOR USE WITH BLUE HAND PIECE ONLY: Brand: HARMONIC FOCUS

## (undated) DEVICE — PAD SACRAL SPAN AID

## (undated) DEVICE — PROBE 8225101 5PK STD PRASS FL TIP ROHS

## (undated) DEVICE — SPONGE RAYTEC 4X4 RF DETECT

## (undated) DEVICE — SUTURE PROLENE 4-0 PS-2

## (undated) DEVICE — 3M™ TEGADERM™ TRANSPARENT FILM DRESSING, 1626W, 4 IN X 4-3/4 IN (10 CM X 12 CM), 50 EACH/CARTON, 4 CARTON/CASE: Brand: 3M™ TEGADERM™

## (undated) DEVICE — 3M(TM) TEGADERM(TM) TRANSPARENT FILM DRESSING FRAME STYLE 9505W: Brand: 3M™ TEGADERM™

## (undated) DEVICE — AIRWAY TRACH W/CUFF 8 8DCT

## (undated) DEVICE — AIRWAY ENDO  TRIVANTAGE 7MM

## (undated) DEVICE — SYRINGE 10ML LL TIP